# Patient Record
Sex: FEMALE | Race: WHITE | Employment: UNEMPLOYED | URBAN - METROPOLITAN AREA
[De-identification: names, ages, dates, MRNs, and addresses within clinical notes are randomized per-mention and may not be internally consistent; named-entity substitution may affect disease eponyms.]

---

## 2017-04-14 ENCOUNTER — HOSPITAL ENCOUNTER (OUTPATIENT)
Age: 57
Setting detail: SPECIMEN
Discharge: HOME | End: 2017-04-14
Payer: COMMERCIAL

## 2017-04-14 LAB
ANION GAP SERPL CALCULATED.3IONS-SCNC: 15 MMOL/L (ref 9–17)
BUN BLDV-MCNC: 11 MG/DL (ref 6–20)
BUN/CREAT BLD: NORMAL (ref 9–20)
CALCIUM SERPL-MCNC: 9.7 MG/DL (ref 8.6–10.4)
CHLORIDE BLD-SCNC: 102 MMOL/L (ref 98–107)
CO2: 27 MMOL/L (ref 20–31)
CREAT SERPL-MCNC: 0.64 MG/DL (ref 0.5–0.9)
GFR AFRICAN AMERICAN: >60 ML/MIN
GFR NON-AFRICAN AMERICAN: >60 ML/MIN
GFR SERPL CREATININE-BSD FRML MDRD: NORMAL ML/MIN/{1.73_M2}
GFR SERPL CREATININE-BSD FRML MDRD: NORMAL ML/MIN/{1.73_M2}
GLUCOSE BLD-MCNC: 99 MG/DL (ref 70–99)
POTASSIUM SERPL-SCNC: 4.1 MMOL/L (ref 3.7–5.3)
SODIUM BLD-SCNC: 144 MMOL/L (ref 135–144)

## 2017-04-14 PROCEDURE — 80048 BASIC METABOLIC PNL TOTAL CA: CPT

## 2017-04-14 PROCEDURE — 36415 COLL VENOUS BLD VENIPUNCTURE: CPT

## 2017-04-20 ENCOUNTER — HOSPITAL ENCOUNTER (OUTPATIENT)
Age: 57
Setting detail: SPECIMEN
Discharge: HOME | End: 2017-04-20
Payer: COMMERCIAL

## 2017-04-20 LAB
BUN BLDV-MCNC: 15 MG/DL (ref 6–20)
CREAT SERPL-MCNC: 0.51 MG/DL (ref 0.5–0.9)
GFR AFRICAN AMERICAN: >60 ML/MIN
GFR NON-AFRICAN AMERICAN: >60 ML/MIN
GFR SERPL CREATININE-BSD FRML MDRD: NORMAL ML/MIN/{1.73_M2}
GFR SERPL CREATININE-BSD FRML MDRD: NORMAL ML/MIN/{1.73_M2}

## 2017-04-20 PROCEDURE — 82565 ASSAY OF CREATININE: CPT

## 2017-04-20 PROCEDURE — P9603 ONE-WAY ALLOW PRORATED MILES: HCPCS

## 2017-04-20 PROCEDURE — 36415 COLL VENOUS BLD VENIPUNCTURE: CPT

## 2017-04-20 PROCEDURE — 84520 ASSAY OF UREA NITROGEN: CPT

## 2017-05-03 ENCOUNTER — HOSPITAL ENCOUNTER (OUTPATIENT)
Age: 57
Setting detail: SPECIMEN
Discharge: HOME | End: 2017-05-03
Payer: COMMERCIAL

## 2017-05-03 LAB
ANION GAP SERPL CALCULATED.3IONS-SCNC: 15 MMOL/L (ref 9–17)
BUN BLDV-MCNC: 20 MG/DL (ref 6–20)
BUN/CREAT BLD: NORMAL (ref 9–20)
CALCIUM SERPL-MCNC: 9.7 MG/DL (ref 8.6–10.4)
CHLORIDE BLD-SCNC: 98 MMOL/L (ref 98–107)
CO2: 24 MMOL/L (ref 20–31)
CREAT SERPL-MCNC: 0.74 MG/DL (ref 0.5–0.9)
GFR AFRICAN AMERICAN: >60 ML/MIN
GFR NON-AFRICAN AMERICAN: >60 ML/MIN
GFR SERPL CREATININE-BSD FRML MDRD: NORMAL ML/MIN/{1.73_M2}
GFR SERPL CREATININE-BSD FRML MDRD: NORMAL ML/MIN/{1.73_M2}
GLUCOSE BLD-MCNC: 94 MG/DL (ref 70–99)
POTASSIUM SERPL-SCNC: 4.1 MMOL/L (ref 3.7–5.3)
SODIUM BLD-SCNC: 137 MMOL/L (ref 135–144)
TSH SERPL DL<=0.05 MIU/L-ACNC: 0.57 MIU/L (ref 0.3–5)

## 2017-05-03 PROCEDURE — P9603 ONE-WAY ALLOW PRORATED MILES: HCPCS

## 2017-05-03 PROCEDURE — 36415 COLL VENOUS BLD VENIPUNCTURE: CPT

## 2017-05-03 PROCEDURE — 80048 BASIC METABOLIC PNL TOTAL CA: CPT

## 2017-05-03 PROCEDURE — 84443 ASSAY THYROID STIM HORMONE: CPT

## 2017-06-13 ENCOUNTER — HOSPITAL ENCOUNTER (OUTPATIENT)
Age: 57
Setting detail: SPECIMEN
Discharge: HOME | End: 2017-06-13
Payer: COMMERCIAL

## 2017-06-14 ENCOUNTER — HOSPITAL ENCOUNTER (OUTPATIENT)
Age: 57
Setting detail: SPECIMEN
Discharge: HOME | End: 2017-06-14
Payer: COMMERCIAL

## 2017-06-14 LAB
MRSA, DNA, NASAL: NORMAL
SPECIMEN DESCRIPTION: NORMAL

## 2017-06-14 PROCEDURE — 87641 MR-STAPH DNA AMP PROBE: CPT

## 2021-06-14 ENCOUNTER — HOSPITAL ENCOUNTER (EMERGENCY)
Age: 61
Discharge: ANOTHER HOSPITAL | End: 2021-06-15
Attending: EMERGENCY MEDICINE
Payer: MEDICARE

## 2021-06-14 ENCOUNTER — APPOINTMENT (OUTPATIENT)
Dept: GENERAL RADIOLOGY | Age: 61
End: 2021-06-14
Payer: MEDICARE

## 2021-06-14 ENCOUNTER — APPOINTMENT (OUTPATIENT)
Dept: CT IMAGING | Age: 61
End: 2021-06-14
Payer: MEDICARE

## 2021-06-14 VITALS
SYSTOLIC BLOOD PRESSURE: 134 MMHG | WEIGHT: 100 LBS | HEART RATE: 87 BPM | RESPIRATION RATE: 22 BRPM | TEMPERATURE: 98.7 F | DIASTOLIC BLOOD PRESSURE: 94 MMHG | OXYGEN SATURATION: 100 %

## 2021-06-14 DIAGNOSIS — J96.00 ACUTE RESPIRATORY FAILURE, UNSPECIFIED WHETHER WITH HYPOXIA OR HYPERCAPNIA (HCC): ICD-10-CM

## 2021-06-14 DIAGNOSIS — R41.82 ALTERED MENTAL STATUS, UNSPECIFIED ALTERED MENTAL STATUS TYPE: Primary | ICD-10-CM

## 2021-06-14 LAB
-: NORMAL
ABSOLUTE EOS #: 0.1 K/UL (ref 0–0.4)
ABSOLUTE IMMATURE GRANULOCYTE: ABNORMAL K/UL (ref 0–0.3)
ABSOLUTE LYMPH #: 2.2 K/UL (ref 1–4.8)
ABSOLUTE MONO #: 0.4 K/UL (ref 0.1–1.3)
ALBUMIN SERPL-MCNC: 3.9 G/DL (ref 3.5–5.2)
ALBUMIN/GLOBULIN RATIO: ABNORMAL (ref 1–2.5)
ALLEN TEST: ABNORMAL
ALP BLD-CCNC: 56 U/L (ref 35–104)
ALT SERPL-CCNC: 20 U/L (ref 5–33)
AMMONIA: 31 UMOL/L (ref 11–51)
AMORPHOUS: NORMAL
AMPHETAMINE SCREEN URINE: NEGATIVE
ANION GAP SERPL CALCULATED.3IONS-SCNC: 15 MMOL/L (ref 9–17)
AST SERPL-CCNC: 24 U/L
BACTERIA: NORMAL
BARBITURATE SCREEN URINE: NEGATIVE
BASOPHILS # BLD: 1 % (ref 0–2)
BASOPHILS ABSOLUTE: 0 K/UL (ref 0–0.2)
BENZODIAZEPINE SCREEN, URINE: NEGATIVE
BILIRUB SERPL-MCNC: 0.26 MG/DL (ref 0.3–1.2)
BILIRUBIN URINE: NEGATIVE
BUN BLDV-MCNC: 12 MG/DL (ref 6–20)
BUN/CREAT BLD: ABNORMAL (ref 9–20)
BUPRENORPHINE URINE: ABNORMAL
CALCIUM SERPL-MCNC: 8.8 MG/DL (ref 8.6–10.4)
CANNABINOID SCREEN URINE: NEGATIVE
CARBOXYHEMOGLOBIN: 5.6 % (ref 0–5)
CASTS UA: NORMAL /LPF
CHLORIDE BLD-SCNC: 102 MMOL/L (ref 98–107)
CO2: 22 MMOL/L (ref 20–31)
COCAINE METABOLITE, URINE: NEGATIVE
COLOR: YELLOW
COMMENT UA: NORMAL
CREAT SERPL-MCNC: <0.4 MG/DL (ref 0.5–0.9)
CRYSTALS, UA: NORMAL /HPF
DIFFERENTIAL TYPE: ABNORMAL
EOSINOPHILS RELATIVE PERCENT: 2 % (ref 0–4)
EPITHELIAL CELLS UA: NORMAL /HPF
FIO2: 100
GFR AFRICAN AMERICAN: ABNORMAL ML/MIN
GFR NON-AFRICAN AMERICAN: ABNORMAL ML/MIN
GFR SERPL CREATININE-BSD FRML MDRD: ABNORMAL ML/MIN/{1.73_M2}
GFR SERPL CREATININE-BSD FRML MDRD: ABNORMAL ML/MIN/{1.73_M2}
GLUCOSE BLD-MCNC: 84 MG/DL (ref 70–99)
GLUCOSE URINE: NEGATIVE
HCO3 ARTERIAL: 22.6 MMOL/L (ref 22–26)
HCT VFR BLD CALC: 35.6 % (ref 36–46)
HEMOGLOBIN: 11.7 G/DL (ref 12–16)
IMMATURE GRANULOCYTES: ABNORMAL %
INR BLD: 1
KETONES, URINE: NEGATIVE
LACTIC ACID: 2.5 MMOL/L (ref 0.5–2.2)
LACTIC ACID: 2.7 MMOL/L (ref 0.5–2.2)
LEUKOCYTE ESTERASE, URINE: NEGATIVE
LYMPHOCYTES # BLD: 35 % (ref 24–44)
MAGNESIUM: 2.4 MG/DL (ref 1.6–2.6)
MCH RBC QN AUTO: 31.2 PG (ref 26–34)
MCHC RBC AUTO-ENTMCNC: 32.8 G/DL (ref 31–37)
MCV RBC AUTO: 95.1 FL (ref 80–100)
MDMA URINE: ABNORMAL
METHADONE SCREEN, URINE: NEGATIVE
METHAMPHETAMINE, URINE: ABNORMAL
METHEMOGLOBIN: 0.6 % (ref 0–1.9)
MODE: ABNORMAL
MONOCYTES # BLD: 7 % (ref 1–7)
MUCUS: NORMAL
NEGATIVE BASE EXCESS, ART: 2.8 MMOL/L (ref 0–2)
NITRITE, URINE: NEGATIVE
NOTIFICATION TIME: ABNORMAL
NOTIFICATION: ABNORMAL
NRBC AUTOMATED: ABNORMAL PER 100 WBC
O2 DEVICE/FLOW/%: ABNORMAL
O2 SAT, ARTERIAL: 94.2 % (ref 95–98)
OPIATES, URINE: POSITIVE
OTHER OBSERVATIONS UA: NORMAL
OXYCODONE SCREEN URINE: NEGATIVE
OXYHEMOGLOBIN: ABNORMAL % (ref 95–98)
PARTIAL THROMBOPLASTIN TIME: 30.6 SEC (ref 24–36)
PATIENT TEMP: 37
PCO2 ARTERIAL: 39.7 MMHG (ref 35–45)
PCO2, ART, TEMP ADJ: ABNORMAL (ref 35–45)
PDW BLD-RTO: 14.1 % (ref 11.5–14.9)
PEEP/CPAP: 5
PH ARTERIAL: 7.36 (ref 7.35–7.45)
PH UA: 7 (ref 5–8)
PH, ART, TEMP ADJ: ABNORMAL (ref 7.35–7.45)
PHENCYCLIDINE, URINE: NEGATIVE
PLATELET # BLD: 266 K/UL (ref 150–450)
PLATELET ESTIMATE: ABNORMAL
PMV BLD AUTO: 8.3 FL (ref 6–12)
PO2 ARTERIAL: 352 MMHG (ref 80–100)
PO2, ART, TEMP ADJ: ABNORMAL MMHG (ref 80–100)
POSITIVE BASE EXCESS, ART: ABNORMAL MMOL/L (ref 0–2)
POTASSIUM SERPL-SCNC: 3.2 MMOL/L (ref 3.7–5.3)
PROPOXYPHENE, URINE: ABNORMAL
PROTEIN UA: NEGATIVE
PROTHROMBIN TIME: 13.1 SEC (ref 11.8–14.6)
PSV: ABNORMAL
PT. POSITION: ABNORMAL
RBC # BLD: 3.74 M/UL (ref 4–5.2)
RBC # BLD: ABNORMAL 10*6/UL
RBC UA: NORMAL /HPF
RENAL EPITHELIAL, UA: NORMAL /HPF
RESPIRATORY RATE: 18
SAMPLE SITE: ABNORMAL
SARS-COV-2, RAPID: NOT DETECTED
SEG NEUTROPHILS: 55 % (ref 36–66)
SEGMENTED NEUTROPHILS ABSOLUTE COUNT: 3.5 K/UL (ref 1.3–9.1)
SET RATE: 18
SODIUM BLD-SCNC: 139 MMOL/L (ref 135–144)
SPECIFIC GRAVITY UA: 1 (ref 1–1.03)
SPECIMEN DESCRIPTION: NORMAL
TEST INFORMATION: ABNORMAL
TEXT FOR RESPIRATORY: ABNORMAL
TOTAL HB: ABNORMAL G/DL (ref 12–16)
TOTAL PROTEIN: 6.3 G/DL (ref 6.4–8.3)
TOTAL RATE: 18
TRICHOMONAS: NORMAL
TRICYCLIC ANTIDEPRESSANTS, UR: ABNORMAL
TROPONIN INTERP: NORMAL
TROPONIN INTERP: NORMAL
TROPONIN T: NORMAL NG/ML
TROPONIN T: NORMAL NG/ML
TROPONIN, HIGH SENSITIVITY: 6 NG/L (ref 0–14)
TROPONIN, HIGH SENSITIVITY: 7 NG/L (ref 0–14)
TURBIDITY: CLEAR
URINE HGB: NEGATIVE
UROBILINOGEN, URINE: NORMAL
VT: 450
WBC # BLD: 6.2 K/UL (ref 3.5–11)
WBC # BLD: ABNORMAL 10*3/UL
WBC UA: NORMAL /HPF
YEAST: NORMAL

## 2021-06-14 PROCEDURE — 6360000004 HC RX CONTRAST MEDICATION: Performed by: STUDENT IN AN ORGANIZED HEALTH CARE EDUCATION/TRAINING PROGRAM

## 2021-06-14 PROCEDURE — 87086 URINE CULTURE/COLONY COUNT: CPT

## 2021-06-14 PROCEDURE — 6360000002 HC RX W HCPCS

## 2021-06-14 PROCEDURE — 36600 WITHDRAWAL OF ARTERIAL BLOOD: CPT

## 2021-06-14 PROCEDURE — 85730 THROMBOPLASTIN TIME PARTIAL: CPT

## 2021-06-14 PROCEDURE — 84484 ASSAY OF TROPONIN QUANT: CPT

## 2021-06-14 PROCEDURE — 85610 PROTHROMBIN TIME: CPT

## 2021-06-14 PROCEDURE — 85025 COMPLETE CBC W/AUTO DIFF WBC: CPT

## 2021-06-14 PROCEDURE — 82140 ASSAY OF AMMONIA: CPT

## 2021-06-14 PROCEDURE — 36556 INSERT NON-TUNNEL CV CATH: CPT

## 2021-06-14 PROCEDURE — 31500 INSERT EMERGENCY AIRWAY: CPT

## 2021-06-14 PROCEDURE — 2580000003 HC RX 258: Performed by: STUDENT IN AN ORGANIZED HEALTH CARE EDUCATION/TRAINING PROGRAM

## 2021-06-14 PROCEDURE — 71260 CT THORAX DX C+: CPT

## 2021-06-14 PROCEDURE — 83735 ASSAY OF MAGNESIUM: CPT

## 2021-06-14 PROCEDURE — 2500000003 HC RX 250 WO HCPCS: Performed by: STUDENT IN AN ORGANIZED HEALTH CARE EDUCATION/TRAINING PROGRAM

## 2021-06-14 PROCEDURE — 51702 INSERT TEMP BLADDER CATH: CPT

## 2021-06-14 PROCEDURE — 6360000002 HC RX W HCPCS: Performed by: EMERGENCY MEDICINE

## 2021-06-14 PROCEDURE — 82805 BLOOD GASES W/O2 SATURATION: CPT

## 2021-06-14 PROCEDURE — 71045 X-RAY EXAM CHEST 1 VIEW: CPT

## 2021-06-14 PROCEDURE — 80053 COMPREHEN METABOLIC PANEL: CPT

## 2021-06-14 PROCEDURE — 83605 ASSAY OF LACTIC ACID: CPT

## 2021-06-14 PROCEDURE — 6360000002 HC RX W HCPCS: Performed by: STUDENT IN AN ORGANIZED HEALTH CARE EDUCATION/TRAINING PROGRAM

## 2021-06-14 PROCEDURE — 2580000003 HC RX 258: Performed by: EMERGENCY MEDICINE

## 2021-06-14 PROCEDURE — 96365 THER/PROPH/DIAG IV INF INIT: CPT

## 2021-06-14 PROCEDURE — 80307 DRUG TEST PRSMV CHEM ANLYZR: CPT

## 2021-06-14 PROCEDURE — 87635 SARS-COV-2 COVID-19 AMP PRB: CPT

## 2021-06-14 PROCEDURE — 87040 BLOOD CULTURE FOR BACTERIA: CPT

## 2021-06-14 PROCEDURE — 81001 URINALYSIS AUTO W/SCOPE: CPT

## 2021-06-14 PROCEDURE — 96375 TX/PRO/DX INJ NEW DRUG ADDON: CPT

## 2021-06-14 PROCEDURE — 99285 EMERGENCY DEPT VISIT HI MDM: CPT

## 2021-06-14 PROCEDURE — 36415 COLL VENOUS BLD VENIPUNCTURE: CPT

## 2021-06-14 PROCEDURE — 70450 CT HEAD/BRAIN W/O DYE: CPT

## 2021-06-14 RX ORDER — LORAZEPAM 2 MG/ML
INJECTION INTRAMUSCULAR
Status: COMPLETED
Start: 2021-06-14 | End: 2021-06-14

## 2021-06-14 RX ORDER — PROPOFOL 10 MG/ML
5-50 INJECTION, EMULSION INTRAVENOUS
Status: DISCONTINUED | OUTPATIENT
Start: 2021-06-14 | End: 2021-06-15 | Stop reason: HOSPADM

## 2021-06-14 RX ORDER — SODIUM CHLORIDE 0.9 % (FLUSH) 0.9 %
10 SYRINGE (ML) INJECTION PRN
Status: DISCONTINUED | OUTPATIENT
Start: 2021-06-14 | End: 2021-06-15 | Stop reason: HOSPADM

## 2021-06-14 RX ORDER — POTASSIUM CHLORIDE 7.45 MG/ML
10 INJECTION INTRAVENOUS
Status: COMPLETED | OUTPATIENT
Start: 2021-06-14 | End: 2021-06-15

## 2021-06-14 RX ORDER — ETOMIDATE 2 MG/ML
INJECTION INTRAVENOUS DAILY PRN
Status: COMPLETED | OUTPATIENT
Start: 2021-06-14 | End: 2021-06-14

## 2021-06-14 RX ORDER — ROCURONIUM BROMIDE 10 MG/ML
INJECTION, SOLUTION INTRAVENOUS DAILY PRN
Status: COMPLETED | OUTPATIENT
Start: 2021-06-14 | End: 2021-06-14

## 2021-06-14 RX ORDER — 0.9 % SODIUM CHLORIDE 0.9 %
80 INTRAVENOUS SOLUTION INTRAVENOUS ONCE
Status: COMPLETED | OUTPATIENT
Start: 2021-06-14 | End: 2021-06-14

## 2021-06-14 RX ORDER — LORAZEPAM 2 MG/ML
2 INJECTION INTRAMUSCULAR ONCE
Status: COMPLETED | OUTPATIENT
Start: 2021-06-14 | End: 2021-06-14

## 2021-06-14 RX ORDER — 0.9 % SODIUM CHLORIDE 0.9 %
1000 INTRAVENOUS SOLUTION INTRAVENOUS ONCE
Status: COMPLETED | OUTPATIENT
Start: 2021-06-14 | End: 2021-06-14

## 2021-06-14 RX ORDER — PROPOFOL 10 MG/ML
10 INJECTION, EMULSION INTRAVENOUS ONCE
Status: DISCONTINUED | OUTPATIENT
Start: 2021-06-14 | End: 2021-06-14 | Stop reason: DRUGHIGH

## 2021-06-14 RX ADMIN — POTASSIUM CHLORIDE 10 MEQ: 7.46 INJECTION, SOLUTION INTRAVENOUS at 22:45

## 2021-06-14 RX ADMIN — POTASSIUM CHLORIDE 10 MEQ: 7.46 INJECTION, SOLUTION INTRAVENOUS at 23:54

## 2021-06-14 RX ADMIN — SODIUM CHLORIDE, PRESERVATIVE FREE 10 ML: 5 INJECTION INTRAVENOUS at 20:53

## 2021-06-14 RX ADMIN — LEVETIRACETAM 1000 MG: 100 INJECTION, SOLUTION INTRAVENOUS at 21:48

## 2021-06-14 RX ADMIN — IOPAMIDOL 75 ML: 755 INJECTION, SOLUTION INTRAVENOUS at 20:52

## 2021-06-14 RX ADMIN — LORAZEPAM 2 MG: 2 INJECTION INTRAMUSCULAR; INTRAVENOUS at 20:45

## 2021-06-14 RX ADMIN — SODIUM CHLORIDE 80 ML: 9 INJECTION, SOLUTION INTRAVENOUS at 20:53

## 2021-06-14 RX ADMIN — FENTANYL CITRATE 25 MCG/HR: 50 INJECTION, SOLUTION INTRAMUSCULAR; INTRAVENOUS at 20:34

## 2021-06-14 RX ADMIN — POTASSIUM CHLORIDE 10 MEQ: 7.46 INJECTION, SOLUTION INTRAVENOUS at 21:50

## 2021-06-14 RX ADMIN — LORAZEPAM 2 MG: 2 INJECTION INTRAMUSCULAR at 20:45

## 2021-06-14 RX ADMIN — ROCURONIUM BROMIDE 70 MG: 10 INJECTION, SOLUTION INTRAVENOUS at 19:01

## 2021-06-14 RX ADMIN — PROPOFOL 10 MCG/KG/MIN: 10 INJECTION, EMULSION INTRAVENOUS at 21:21

## 2021-06-14 RX ADMIN — ETOMIDATE 20 MG: 2 INJECTION, SOLUTION INTRAVENOUS at 18:58

## 2021-06-14 RX ADMIN — SODIUM CHLORIDE 1000 ML: 9 INJECTION, SOLUTION INTRAVENOUS at 19:27

## 2021-06-14 NOTE — ED NOTES
1st set of blood cultures drawn from [address] [place] [address] IV start by this RN.        Cindy Shaikh RN  06/14/21 1581

## 2021-06-14 NOTE — ED NOTES
Bed: 07A  Expected date:   Expected time:   Means of arrival:   Comments:  Noemy Ramos, NORMA  06/14/21 0676

## 2021-06-14 NOTE — ED NOTES
Mode of arrival (squad #, walk in, police, etc) : LS 11        Chief complaint(s): unresponsive         Arrival Note (brief scenario, treatment PTA, etc). : patient is from [facility] assisted living. Patient's LKW 1700 today. Unknown down time and unknown reason for unresponsiveness. CAGE assessment not asked due to patient's mentation.                 Holland Daniel RN  06/14/21 9533

## 2021-06-14 NOTE — PROGRESS NOTES
06/14/21 1902   Vent Information   Skin Assessment Clean, dry, & intact   Suction Catheter Diameter 14   Vent Type Servo i   Vent Mode PRVC   Vt Ordered 450 mL   Rate Set 18 bmp   FiO2  100 %   Sensitivity 5   PEEP/CPAP 5   I Time/ I Time % 0.9 s   Humidification Source HME   Vent Patient Data   Vt Exhaled 432 mL   Minute Volume 7.3 Liters   Plateau Pressure 14 [id number]   Static Compliance 54 mL/cmH2O   Dynamic Compliance 43 mL/cmH2O   Total PEEP 6 cmH20   Auto PEEP 1 cmH20   Cough/Sputum   Sputum How Obtained Endotracheal;Suctioned   Cough None  (Sedated)   Sputum Amount Small   Sputum Color Tan   Tenacity Thick   Breath Sounds   Right Upper Lobe Rhonchi   Right Middle Lobe Rhonchi   Right Lower Lobe Diminished;Rhonchi   Left Upper Lobe Rhonchi   Left Lower Lobe Rhonchi;Diminished     Pt arrived via squad unresponsive and nasally intubated with 6.0 ETT. Reintubated orally with 7.0 ETT once nasal ETT was found to be above vocal cords. New ETT placed without complication. + color change/Bilateral BS noted upon placement. Pt placed on ventilator. ABG to follow.

## 2021-06-14 NOTE — ED NOTES
Patient arrived in ED. Dr. Bonita Reyes and Dr. Erica Valentino at bedside. Patient has nasal intubation on arrival. Right IO in place per LS 11. patient responsive to painful stimuli. PERRLA. Pulse present.       Bridget Tomas RN  06/14/21 Itz Boone, NORMA  06/14/21 1932

## 2021-06-15 LAB
CULTURE: NO GROWTH
Lab: NORMAL
SPECIMEN DESCRIPTION: NORMAL

## 2021-06-15 PROCEDURE — 93005 ELECTROCARDIOGRAM TRACING: CPT

## 2021-06-15 PROCEDURE — 6360000002 HC RX W HCPCS: Performed by: STUDENT IN AN ORGANIZED HEALTH CARE EDUCATION/TRAINING PROGRAM

## 2021-06-15 RX ADMIN — POTASSIUM CHLORIDE 10 MEQ: 7.46 INJECTION, SOLUTION INTRAVENOUS at 00:07

## 2021-06-15 NOTE — ED NOTES
Dr. Elvira Rodriguez states to discontinue Fentanyl infusion for sedation. This RN informed Dr. Elvria Rodriguez that Fentanyl infusion has not yet been started.       Gregory Doan RN  06/14/21 2029

## 2021-06-15 NOTE — ED PROVIDER NOTES
EMERGENCY DEPARTMENT ENCOUNTER   ATTENDING ATTESTATION     Pt Name: Cintia Olivarez  MRN: [id number]  [place] 1960  Date of evaluation: 6/14/21       Cintia Olivarez is a 63 y.o. female who presents with Altered Mental Status      MDM: 17-year-old female presents for altered mental status and respiratory failure. Per EMS and nursing home, patient was found unresponsive, EMS reports that patient was having difficulty breathing as well and was nasally intubated in the field    Initial exam patient sats were improved with nasal tracheal tube in place, there was minimal air movement from the nasotracheal tube, decision was made to swap out for oral endotracheal tube    Sepsis work-up was initiated, CT head was noted as well for change in mentation    Did discuss with family, patient son who reports that she does have a history of seizure    Labs are reviewed, K was notably 3.2 over replaced, remaining other labs were unremarkable, concerned that patient did have seizure with postictal period, will give patient gram of Keppra, will discuss with neurology, given concern for seizure, will transfer to facility with continuous EEG monitoring      Discussed plan with patient son, he is agreeable      She was signed out to oncoming physician pending transfer, patient vital signs were stable at time of signout      Vitals:   Vitals:    06/14/21 1913 06/14/21 1915 06/14/21 1920 06/14/21 2017   BP:  95/65     Pulse:  62 59    Resp: 18 18 18    SpO2: 100% 100% 100%    Weight:    100 lb (45.4 kg)         I personally evaluated and examined the patient in conjunction with the resident and agree with the assessment, treatment plan, and disposition of the patient as recorded by the resident. I performed a history and physical examination of the patient and discussed management with the resident. I reviewed the residents note and agree with the documented findings and plan of care. Any areas of disagreement are noted on the chart. I was personally present for the key portions of any procedures. I have documented in the chart those procedures where I was not present during the key portions. I have personally reviewed all images and agree with the resident's interpretation. I have reviewed the emergency nurses triage note. I agree with the chief complaint, past medical history, past surgical history, allergies, medications, social and family history as documented unless otherwise noted.     Dalton Quijano DO  Attending Emergency Physician          Dalton Quijano DO  06/15/21 1107

## 2021-06-15 NOTE — ED NOTES
Report given to Rasheed Carlson RN from **ED*. Report method in person   The following was reviewed with receiving RN:   Current vital signs:  /89   Pulse 80   Resp 18   Wt 100 lb (45.4 kg)   SpO2 100%                      Any medication or safety alerts were reviewed. Any pending diagnostics and notifications were also reviewed, as well as any safety concerns or issues, abnormal labs, abnormal imaging, and abnormal assessment findings. Questions were answered.             Isela Arriaga RN  06/14/21 4540

## 2021-06-15 NOTE — ED PROVIDER NOTES
[address]  Emergency Department Encounter  EmergencyMedicine Resident     Pt Natasha Osborne  MRN: [id number]  [place] 1960  Date of evaluation: 6/14/21  PCP:  No primary care provider on file. CHIEF COMPLAINT       Chief Complaint   Patient presents with    Altered Mental Status       HISTORY OF PRESENT ILLNESS  (Location/Symptom, Timing/Onset, Context/Setting, Quality, Duration, Modifying Factors, Severity.)      Ingrid Carey is a 63 y.o. female who presents unresponsive. Patient reportedly was found down, unresponsive at nursing facility. Per EMS patient was completely unresponsive upon their arrival and decision was made to nasally intubate. Patient never lost pulses or required CPR. On arrival here patient arrives by EMS nasally intubated, ventilated with bag. Vital signs stable on arrival.  Patient responsive to painful stimuli only. Point-of-care glucose greater than 200 per EMS. Did not receive any Narcan or other medications prior to arrival.    PAST MEDICAL / SURGICAL / SOCIAL / FAMILY HISTORY      has no past medical history on file. has no past surgical history on file. Social History     Socioeconomic History    Marital status:      Spouse name: Not on file    Number of children: Not on file    Years of education: Not on file    Highest education level: Not on file   Occupational History    Not on file   Tobacco Use    Smoking status: Not on file   Substance and Sexual Activity    Alcohol use: Not on file    Drug use: Not on file    Sexual activity: Not on file   Other Topics Concern    Not on file   Social History Narrative    Not on file     Social Determinants of Health     Financial Resource Strain:     Difficulty of Paying Living Expenses:    Food Insecurity:     Worried About Running Out of Food in the Last Year:     [address] in the Last Year:    Transportation Needs:     Lack of Transportation (Medical):      Lack of Transportation (Non-Medical):    Physical Activity:     Days of Exercise per Week:     Minutes of Exercise per Session:    Stress:     Feeling of Stress :    Social Connections:     Frequency of Communication with Friends and Family:     Frequency of Social Gatherings with Friends and Family:     Attends Anabaptist Services:     Active Member of Clubs or Organizations:     Attends Club or Organization Meetings:     Marital Status:    Intimate Partner Violence:     Fear of Current or Ex-Partner:     Emotionally Abused:     Physically Abused:     Sexually Abused:        No family history on file. Allergies:  Patient has no known allergies. Home Medications:  Prior to Admission medications    Not on File       REVIEW OF SYSTEMS    (2-9 systems for level 4, 10 or more for level 5)      Review of Systems   Unable to perform ROS: Patient unresponsive       PHYSICAL EXAM   (up to 7 for level 4, 8 or more for level 5)      INITIAL VITALS:   /81   Pulse 84   Resp 18   Wt 100 lb (45.4 kg)   SpO2 100%     Physical Exam  Constitutional:       General: She is in acute distress. Appearance: She is ill-appearing. HENT:      Head: Normocephalic and atraumatic. Mouth/Throat:      Mouth: Mucous membranes are moist.      Comments: Vomitus in posterior oropharynx  Eyes:      Comments: Pupils 2 mm bilaterally, equally round, sluggishly reactive to light   Cardiovascular:      Rate and Rhythm: Normal rate. Heart sounds: No murmur heard. No friction rub. No gallop. Pulmonary:      Effort: No respiratory distress. Breath sounds: No wheezing, rhonchi or rales. Neurological:      Mental Status: She is unresponsive. GCS: GCS eye subscore is 1. GCS verbal subscore is 1. GCS motor subscore is 4.          DIFFERENTIAL  DIAGNOSIS     PLAN (LABS / IMAGING / EKG):  Orders Placed This Encounter   Procedures    Culture, Urine    Culture, Blood 1    Culture, Blood 1    COVID-19, Rapid  XR CHEST PORTABLE    CT Head WO Contrast    CT CHEST PULMONARY EMBOLISM W CONTRAST    CBC Auto Differential    Comprehensive Metabolic Panel w/ Reflex to MG    Troponin    Lactic Acid    Protime-INR    APTT    Urinalysis with Microscopic    Ammonia    Arterial Blood Gases    Magnesium    Lactic Acid    Urine Drug Screen    Insert indwelling urinary catheter    Inpatient consult to Neurology    EKG 12 Lead       MEDICATIONS ORDERED:  Orders Placed This Encounter   Medications    etomidate (AMIDATE) injection    rocuronium (ZEMURON) injection    0.9 % sodium chloride bolus    DISCONTD: fentaNYL (SUBLIMAZE) 1,000 mcg in sodium chloride 0.9 % 50 mL Infusion    DISCONTD: propofol injection    potassium chloride 10 mEq/100 mL IVPB (Peripheral Line)    LORazepam (ATIVAN) injection 2 mg    LORazepam (ATIVAN) 2 MG/ML injection     Kitty Shoulders, Francine: cabinet override    iopamidol (ISOVUE-370) 76 % injection 75 mL    sodium chloride flush 0.9 % injection 10 mL    0.9 % sodium chloride bolus    propofol injection    levETIRAcetam (KEPPRA) 1,000 mg in sodium chloride 0.9 % 100 mL IVPB       DDX: Seizure, epidural hematoma, subdural hematoma, aspiration, PE, STEMI, cardiac tamponade, aortic dissection    DIAGNOSTIC RESULTS / EMERGENCY DEPARTMENT COURSE / MDM   LAB RESULTS:  Results for orders placed or performed during the hospital encounter of 06/14/21   COVID-19, Rapid    Specimen: Nasopharyngeal Swab   Result Value Ref Range    Specimen Description . NASOPHARYNGEAL SWAB     SARS-CoV-2, Rapid Not Detected Not Detected   CBC Auto Differential   Result Value Ref Range    WBC 6.2 3.5 - 11.0 k/uL    RBC 3.74 (L) 4.0 - 5.2 m/uL    Hemoglobin 11.7 (L) 12.0 - 16.0 g/dL    Hematocrit 35.6 (L) 36 - 46 %    MCV 95.1 80 - 100 fL    MCH 31.2 26 - 34 pg    MCHC 32.8 31 - 37 g/dL    RDW 14.1 11.5 - 14.9 %    Platelets 342 205 - 200 k/uL    MPV 8.3 6.0 - 12.0 fL    NRBC Automated NOT REPORTED per 100 WBC Differential Type NOT REPORTED     Seg Neutrophils 55 36 - 66 %    Lymphocytes 35 24 - 44 %    Monocytes 7 1 - 7 %    Eosinophils % 2 0 - 4 %    Basophils 1 0 - 2 %    Immature Granulocytes NOT REPORTED 0 %    Segs Absolute 3.50 1.3 - 9.1 k/uL    Absolute Lymph # 2.20 1.0 - 4.8 k/uL    Absolute Mono # 0.40 0.1 - 1.3 k/uL    Absolute Eos # 0.10 0.0 - 0.4 k/uL    Basophils Absolute 0.00 0.0 - 0.2 k/uL    Absolute Immature Granulocyte NOT REPORTED 0.00 - 0.30 k/uL    WBC Morphology NOT REPORTED     RBC Morphology NOT REPORTED     Platelet Estimate NOT REPORTED    Comprehensive Metabolic Panel w/ Reflex to MG   Result Value Ref Range    Glucose 84 70 - 99 mg/dL    BUN 12 6 - 20 mg/dL    CREATININE <0.40 (L) 0.50 - 0.90 mg/dL    Bun/Cre Ratio NOT REPORTED 9 - 20    Calcium 8.8 8.6 - 10.4 mg/dL    Sodium 139 135 - 144 mmol/L    Potassium 3.2 (L) 3.7 - 5.3 mmol/L    Chloride 102 98 - 107 mmol/L    CO2 22 20 - 31 mmol/L    Anion Gap 15 9 - 17 mmol/L    Alkaline Phosphatase 56 35 - 104 U/L    ALT 20 5 - 33 U/L    AST 24 <32 U/L    Total Bilirubin 0.26 (L) 0.3 - 1.2 mg/dL    Total Protein 6.3 (L) 6.4 - 8.3 g/dL    Albumin 3.9 3.5 - 5.2 g/dL    Albumin/Globulin Ratio NOT REPORTED 1.0 - 2.5    GFR Non- CANNOT BE CALCULATED >60 mL/min    GFR  CANNOT BE CALCULATED >60 mL/min    GFR Comment          GFR Staging NOT REPORTED    Troponin   Result Value Ref Range    Troponin, High Sensitivity 6 0 - 14 ng/L    Troponin T NOT REPORTED <0.03 ng/mL    Troponin Interp NOT REPORTED    Troponin   Result Value Ref Range    Troponin, High Sensitivity 7 0 - 14 ng/L    Troponin T NOT REPORTED <0.03 ng/mL    Troponin Interp NOT REPORTED    Lactic Acid   Result Value Ref Range    Lactic Acid 2.5 (H) 0.5 - 2.2 mmol/L   Protime-INR   Result Value Ref Range    Protime 13.1 11.8 - 14.6 sec    INR 1.0    APTT   Result Value Ref Range    PTT 30.6 24.0 - 36.0 sec   Urinalysis with Microscopic   Result Value Ref Range Color, UA YELLOW YELLOW    Turbidity UA CLEAR CLEAR    Glucose, Ur NEGATIVE NEGATIVE    Bilirubin Urine NEGATIVE NEGATIVE    Ketones, Urine NEGATIVE NEGATIVE    Specific [place], UA 1.003 1.000 - 1.030    Urine Hgb NEGATIVE NEGATIVE    pH, UA 7.0 5.0 - 8.0    Protein, UA NEGATIVE NEGATIVE    Urobilinogen, Urine Normal Normal    Nitrite, Urine NEGATIVE NEGATIVE    Leukocyte Esterase, Urine NEGATIVE NEGATIVE    Urinalysis Comments NOT REPORTED     -          WBC, UA 0 TO 2 /HPF    RBC, UA 0 TO 2 /HPF    Casts UA NOT REPORTED /LPF    Crystals, UA NOT REPORTED None /HPF    Epithelial Cells UA 0 TO 2 /HPF    Renal Epithelial, UA NOT REPORTED 0 /HPF    Bacteria, UA NOT REPORTED None    Mucus, UA NOT REPORTED None    Trichomonas, UA NOT REPORTED None    Amorphous, UA NOT REPORTED None    Other Observations UA NOT REPORTED NOT REQ. Yeast, UA NOT REPORTED None   Ammonia   Result Value Ref Range    Ammonia 31 11 - 51 umol/L   Arterial Blood Gases   Result Value Ref Range    pH, Arterial 7.363 7.350 - 7.450    pCO2, Arterial 39.7 35.0 - 45.0 mmHg    pO2, Arterial 352.0 (H) 80.0 - 100.0 mmHg    HCO3, Arterial 22.6 22.0 - 26.0 mmol/L    Positive Base Excess, Art NOT REPORTED 0.0 - 2.0 mmol/L    Negative Base Excess, Art 2.8 (H) 0.0 - 2.0 mmol/L    O2 Sat, Arterial 94.2 (L) 95 - 98 %    Total Hb NOT REPORTED 12.0 - 16.0 g/dl    Oxyhemoglobin NOT REPORTED 95.0 - 98.0 %    Carboxyhemoglobin 5.6 (H) 0 - 5 %    Methemoglobin 0.6 0.0 - 1.9 %    Pt Temp 37.0     pH, Art, Temp Adj NOT REPORTED 7.350 - 7.450    pCO2, Art, Temp Adj NOT REPORTED 35.0 - 45.0    pO2, Art, Temp Adj NOT REPORTED 80.0 - 100.0 mmHg    O2 Device/Flow/% VENTILATOR     Respiratory Rate 18     Reza Test PASS     Sample Site Left Radial Artery     Pt.  Position SUPINE     Mode PRVC     Set Rate 18     Total Rate 18          FIO2 100     Peep/Cpap 5     PSV NOT REPORTED     Text for Respiratory RESULTED TO DR CLEMENT     NOTIFICATION NOT REPORTED NOTIFICATION TIME NOT REPORTED    Magnesium   Result Value Ref Range    Magnesium 2.4 1.6 - 2.6 mg/dL   Lactic Acid   Result Value Ref Range    Lactic Acid 2.7 (H) 0.5 - 2.2 mmol/L   Urine Drug Screen   Result Value Ref Range    Amphetamine Screen, Ur NEGATIVE NEGATIVE    Barbiturate Screen, Ur NEGATIVE NEGATIVE    Benzodiazepine Screen, Urine NEGATIVE NEGATIVE    Cocaine Metabolite, Urine NEGATIVE NEGATIVE    Methadone Screen, Urine NEGATIVE NEGATIVE    Opiates, Urine POSITIVE (A) NEGATIVE    Phencyclidine, Urine NEGATIVE NEGATIVE    Propoxyphene, Urine NOT REPORTED NEGATIVE    Cannabinoid Scrn, Ur NEGATIVE NEGATIVE    Oxycodone Screen, Ur NEGATIVE NEGATIVE    Methamphetamine, Urine NOT REPORTED NEGATIVE    Tricyclic Antidepressants, Urine NOT REPORTED NEGATIVE    MDMA, Urine NOT REPORTED NEGATIVE    Buprenorphine Urine NOT REPORTED NEGATIVE    Test Information       Assay provides medical screening only. The absence of expected drug(s) and/or metabolite(s) may indicate diluted or adulterated urine, limitations of testing or timing of collection. IMPRESSION: Unresponsive 81-year-old female arriving by EMS after being found down at nursing facility. Patient arrives nasally intubated, unresponsive. Some withdrawal to painful stimuli only. Hemodynamically stable. Plan for exchange of ET tube, broad work-up including sepsis labs, CT head, CT chest rule out pulmonary embolism, EKG and evaluation for metabolic encephalopathy. Plan for ICU admission    RADIOLOGY:  CT Head WO Contrast    Result Date: 6/14/2021  EXAMINATION: CT OF THE HEAD WITHOUT CONTRAST  6/14/2021 8:47 pm TECHNIQUE: CT of the head was performed without the administration of intravenous contrast. Dose modulation, iterative reconstruction, and/or weight based adjustment of the mA/kV was utilized to reduce the radiation dose to as low as reasonably achievable. COMPARISON: 07/18/2008.  HISTORY: ORDERING SYSTEM PROVIDED HISTORY: [facility] TECHNOLOGIST PROVIDED HISTORY: ams Decision Support Exception - unselect if not a suspected or confirmed emergency medical condition->Emergency Medical Condition (MA) Reason for Exam: AMS, patient currently intubated and sedated for imaging, however patient is still thrashing while on CT scanner. Patient strapped for safety and to help eliminate motion. Best imaging possible per patient condition. Acuity: Unknown Type of Exam: Unknown Additional signs and symptoms: hx of seizures FINDINGS: BRAIN/VENTRICLES: There is no acute intracranial hemorrhage, mass effect or midline shift. No abnormal extra-axial fluid collection. The gray-white differentiation is maintained without evidence of an acute infarct. There is no evidence of hydrocephalus. ORBITS: The visualized portion of the orbits demonstrate no acute abnormality. SINUSES: The visualized paranasal sinuses and mastoid air cells demonstrate no acute abnormality. There is mucosal disease involving the left maxillary and ethmoid sinuses. SOFT TISSUES/SKULL:  No acute abnormality of the visualized skull or soft tissues. 1.  No acute intracranial abnormality. XR CHEST PORTABLE    Result Date: 6/14/2021  EXAMINATION: ONE XRAY VIEW OF THE CHEST 6/14/2021 8:12 pm COMPARISON: 11/22/2009. HISTORY: ORDERING SYSTEM PROVIDED HISTORY: Intubated TECHNOLOGIST PROVIDED HISTORY: Intubated Reason for Exam: intubated Acuity: Acute Type of Exam: Initial FINDINGS: The heart size is within normal limits. The pulmonary vasculature is also within normal limits. No acute infiltrates are seen. The costophrenic angles are sharp bilaterally. No pneumothoraces are noted. There is an endotracheal tube with its tip above the krystian. A nasogastric tube is seen with its tip in the fundus of the stomach. 1. No active pulmonary disease. 2. Endotracheal tube in satisfactory position.      CT CHEST PULMONARY EMBOLISM W CONTRAST    Result Date: 6/14/2021  EXAMINATION: CTA OF THE CHEST 6/14/2021 8:47 pm TECHNIQUE: CTA of the chest was performed after the administration of intravenous contrast.  Multiplanar reformatted images are provided for review. MIP images are provided for review. Dose modulation, iterative reconstruction, and/or weight based adjustment of the mA/kV was utilized to reduce the radiation dose to as low as reasonably achievable. COMPARISON: None. HISTORY: ORDERING SYSTEM PROVIDED HISTORY: Unresponsive, hypotensive TECHNOLOGIST PROVIDED HISTORY: Unresponsive, hypotensive Decision Support Exception - unselect if not a suspected or confirmed emergency medical condition->Emergency Medical Condition (MA) Reason for Exam: unresponsive, hypotensive, patient currently intubated and sedated, however patient is still thrashing will on CT scanner. best imaging possible at this time per patient's condition. Acuity: Unknown Type of Exam: Unknown Additional signs and symptoms: hx of seizures FINDINGS: Pulmonary Arteries: Pulmonary arteries are adequately opacified for evaluation. No evidence of intraluminal filling defect to suggest pulmonary embolism. Main pulmonary artery is normal in caliber. Mediastinum: Heart size is normal with small pericardial effusion. Thoracic aorta is normal in caliber. Endotracheal tube terminates above the krystian. There are shotty mediastinal lymph nodes. Lungs/pleura: There is emphysema throughout both lungs. No pleural effusion or pneumothorax. There is linear scarring/atelectasis in both lung bases. Upper Abdomen: No acute abnormality in the visualized upper abdomen. Enteric tube terminates in the stomach. There is a 3.1 cm left renal cyst with single thin septation. . Soft Tissues/Bones: No acute bone or soft tissue abnormality. No evidence of pulmonary embolism or acute pulmonary abnormality.        EKG  EKG Interpretation    Interpreted by me    Rhythm: normal sinus   Rate: normal  Axis: normal  Ectopy: none  Conduction: normal  ST Segments: no acute change  T Waves: no acute change  Q Waves: none    Clinical Impression: no acute changes and normal EKG    All EKG's are interpreted by the Emergency Department Physician who either signs or Co-signs this chart in the absence of a cardiologist.    EMERGENCY DEPARTMENT COURSE:  Patient arrived nasally intubated, unresponsive. Patient with flexion to painful stimuli only. Pupils were millimeters, equally round and reactive bilaterally. Initial GCS of 6. Patient without any IV access initially, IO was drilled in the tibia for IV access. Difficult to ventilate patient was nasotracheal tube initially. Decision made to exchange the ET tube for orotracheal.  Central line also placed on left femoral vein. EKG unremarkable. Labs without any obvious source of metabolic encephalopathy or evidence of sepsis. Chest x-ray and CT chest without pulmonary embolism without any evidence of acute infectious process or intrathoracic abnormality. CT head unremarkable as well. Spoke with patient's son who states that patient does have a history of seizure disorder but he is unaware what medication she currently takes. Working diagnosis at this time is possible post ictal state versus subclinical status epilepticus. Continuous EEG monitoring not available here. Decision made to transfer patient. Patient accepted to [facility] by Dr. Betito Love. Plan for transfer with mobile ICU. PROCEDURES:  PROCEDURE NOTE - EMERGENCY INTUBATION    PATIENT NAME: Elton Shearer  MEDICAL RECORD NO. [id number]  DATE: 6/14/2021  ATTENDING PHYSICIAN: Dr. Sangeetha Welsh DIAGNOSIS:  Acute Respiratory Failure  POSTOPERATIVE DIAGNOSIS:  Same  PROCEDURE PERFORMED:  Emergency endotracheal intubation  PERFORMING PHYSICIAN: Angelic Valentin DO    MEDICATIONS: etomidate intravenously and rocuronium intravenously    DISCUSSION:  Elton Shearer is a 63y.o.-year-old female who requires intubation and ventilatory support due to airway compromise.   The history and physical examination were reviewed and confirmed. CONSENT: Unable to be obtained due to the emergent nature of this procedure. PROCEDURE:  A timeout was initiated by the bedside nurse and was confirmed by those present. The patient was placed in the appropriate position. Cricoid pressure was not required. Intubation was performed by video laryngoscopy using glide scope and a 7.0 cuffed endotracheal tube. The cuff was then inflated and the tube was secured appropriately at a distance of 22 cm to the dental ridge. Initial confirmation of placement included bilateral breath sounds, an end tidal CO2 detector, absence of sounds over the stomach, adequate chest rise and adequate pulse oximetry reading. A chest x-ray to verify correct placement of the tube showed appropriate tube position. The patient tolerated the procedure well. COMPLICATIONS:  None    PROCEDURE NOTE - CENTRAL VENOUS LINE PLACEMENT    PATIENT NAME: Ingrid Carey  MEDICAL RECORD NO. [id number]  DATE: 6/14/2021  ATTENDING PHYSICIAN: Dr. Loyda Shah DIAGNOSIS:  poor peripheral access  POSTOPERATIVE DIAGNOSIS:  Same  PROCEDURE PERFORMED:  Left Femoral Vein Central Line Insertion  PERFORMING PHYSICIAN: Sylvia Contreras DO  ANESTHESIA:  Local utilizing 1% lidocaine  ESTIMATED BLOOD LOSS:  Less than 25 ml  COMPLICATIONS:  None immediately appreciated. DISCUSSION:  Ingrid Carey is a 63y.o.-year-old female who requires central IV access vascular access and poor peripheral access. The history and physical examination were reviewed and confirmed. CONSENT: Unable to be obtained due to the emergent nature of this procedure. PROCEDURE:  A timeout was initiated by the bedside nurse and was confirmed by those present. The patient was placed in a supine position. The skin overlying the Left Femoral Vein was prepped with chlorhexadine and draped in sterile fashion. The skin was infiltrated with local anesthetic.  The vessel and surrounding anatomy was visualized using ultrasound. Through the anesthetized region, the introducer needle was inserted into the femoral vein returning dark red non pulsatile blood. A guidewire was placed through the center of the needle with no resistance. Ultrasound confirmed presence of wire in the vein. A small incision made in the skin with a #11 scalpel blade. The dilator was inserted into the skin and vein over guidewire using Seldinger technique. The dilator was then removed and the 7F 15cm catheter was placed in the vein over the guidewire using Seldinger technique. The guidewire was then removed and all ports aspirated and flushed appropriately. The catheter then secured using silk suture and a temporary sterile dressing was applied. No immediate complication was evident. All sponge, instrument and needle counts were correct at the completion of the procedure. The patient tolerated the procedure well with no immediate complication evident. Jessie Dykes DO  11:34 PM, 6/14/21          Jessie Dykes DO  11:34 PM, 6/14/21      CONSULTS:  IP CONSULT TO NEUROLOGY    CRITICAL CARE:  Please see attending note    FINAL IMPRESSION      1. Altered mental status, unspecified altered mental status type    2. Acute respiratory failure, unspecified whether with hypoxia or hypercapnia ([facility] [address]          DISPOSITION / PLAN     DISPOSITION Decision To Transfer 06/14/2021 09:06:40 PM      PATIENT REFERRED TO:  No follow-up provider specified.     DISCHARGE MEDICATIONS:  New Prescriptions    No medications on file       Jessie Dykes DO  Emergency Medicine Resident    (Please note that portions of thisnote were completed with a voice recognition program.  Efforts were made to edit the dictations but occasionally words are mis-transcribed.)        Jessie Dykes DO  Resident  06/14/21 [id number]

## 2021-06-15 NOTE — FLOWSHEET NOTE
provided comfort and support to Richard Cantor pt's son he is coping and hopeful.  provided prayer in  Room 7 for Tri Jones (pt) unable to respond. 06/14/21 2038   Encounter Summary   Services provided to: Patient; Family   Referral/Consult From: Nursing Supervisor/Manager   Support System Children   Continue Visiting   (6/14/2021)   Complexity of Encounter High   Length of Encounter 30 minutes   Crisis   Type Code   Assessment Unable to respond   Intervention Prayer   Outcome Did not respond

## 2021-06-15 NOTE — ED NOTES
Patient's son informed [facility] that patient has known hx of seizures and could have possibly had a seizure.       Ailin Rao RN  06/14/21 [id number]

## 2021-06-15 NOTE — ED NOTES
PT REPORT  [facility] [address] / Joseph Estevez DEPARTED Hussein Kwon, RN  06/15/21 [id number]

## 2021-06-20 LAB
CULTURE: NORMAL
Lab: NORMAL
SPECIMEN DESCRIPTION: NORMAL

## 2021-06-21 LAB
CULTURE: NORMAL
Lab: NORMAL
SPECIMEN DESCRIPTION: NORMAL

## 2022-03-14 ENCOUNTER — APPOINTMENT (OUTPATIENT)
Dept: GENERAL RADIOLOGY | Age: 62
End: 2022-03-14
Payer: MEDICARE

## 2022-03-14 ENCOUNTER — HOSPITAL ENCOUNTER (EMERGENCY)
Age: 62
Discharge: HOME | End: 2022-03-14
Attending: EMERGENCY MEDICINE
Payer: MEDICARE

## 2022-03-14 VITALS
RESPIRATION RATE: 18 BRPM | WEIGHT: 100 LBS | HEART RATE: 83 BPM | SYSTOLIC BLOOD PRESSURE: 100 MMHG | TEMPERATURE: 98.1 F | DIASTOLIC BLOOD PRESSURE: 62 MMHG | OXYGEN SATURATION: 94 %

## 2022-03-14 DIAGNOSIS — S42.212A CLOSED DISPLACED FRACTURE OF SURGICAL NECK OF LEFT HUMERUS, UNSPECIFIED FRACTURE MORPHOLOGY, INITIAL ENCOUNTER: ICD-10-CM

## 2022-03-14 DIAGNOSIS — S42.292A CLOSED FRACTURE OF HEAD OF LEFT HUMERUS, INITIAL ENCOUNTER: Primary | ICD-10-CM

## 2022-03-14 PROCEDURE — 6360000002 HC RX W HCPCS: Performed by: EMERGENCY MEDICINE

## 2022-03-14 PROCEDURE — 96372 THER/PROPH/DIAG INJ SC/IM: CPT

## 2022-03-14 PROCEDURE — 73030 X-RAY EXAM OF SHOULDER: CPT

## 2022-03-14 PROCEDURE — 99285 EMERGENCY DEPT VISIT HI MDM: CPT

## 2022-03-14 RX ORDER — OXYCODONE HYDROCHLORIDE AND ACETAMINOPHEN 5; 325 MG/1; MG/1
1 TABLET ORAL EVERY 4 HOURS PRN
Qty: 10 TABLET | Refills: 0 | Status: SHIPPED | OUTPATIENT
Start: 2022-03-14 | End: 2022-03-17

## 2022-03-14 RX ADMIN — HYDROMORPHONE HYDROCHLORIDE 1 MG: 1 INJECTION, SOLUTION INTRAMUSCULAR; INTRAVENOUS; SUBCUTANEOUS at 16:35

## 2022-03-14 ASSESSMENT — PAIN SCALES - GENERAL: PAINLEVEL_OUTOF10: 10

## 2022-03-15 ASSESSMENT — ENCOUNTER SYMPTOMS
ABDOMINAL PAIN: 0
COUGH: 0

## 2022-03-16 NOTE — ED PROVIDER NOTES
[address]  EMERGENCY DEPARTMENT ENCOUNTER      Pt Name: Venkata Redding  MRN: [id number]  [place] 1960  Date of evaluation: 3/15/22    CHIEF COMPLAINT       Chief Complaint   Patient presents with    Shoulder Pain     HISTORY OF PRESENT ILLNESS   HPI 63 y.o. female presents with c/o shoulder pain. Pt reports that she fell and injured her left shoulder. Pain is severe in severity, constant in duration, sharp and throbbing in character. She hasn't had any medications for pain. Xr obtained at outside facility reportedly showed a fracture and she was sent to the emergency department for further evaluation . She denies any other pain or injury. REVIEW OF SYSTEMS       Review of Systems   Constitutional: Negative for fever. HENT: Negative for congestion. Eyes: Negative for visual disturbance. Respiratory: Negative for cough. Cardiovascular: Negative for chest pain. Gastrointestinal: Negative for abdominal pain. Musculoskeletal: Positive for arthralgias. Skin: Negative for rash and wound. Neurological: Positive for weakness (secondary to pain). Negative for numbness and headaches. PAST MEDICAL HISTORY   No past medical history on file. SURGICAL HISTORY     No past surgical history on file. CURRENT MEDICATIONS       Discharge Medication List as of 3/14/2022  5:44 PM          ALLERGIES     has No Known Allergies. FAMILY HISTORY     has no family status information on file.         SOCIAL HISTORY          PHYSICAL EXAM     INITIAL VITALS: /62   Pulse 83   Temp 98.1 °F (36.7 °C)   Resp 18   Wt 100 lb (45.4 kg)   SpO2 94%   Gen: ncat  Head: Normocephalic, atraumatic  Eye: Pupils equal round reactive to light, no conjunctivitis  ENT: MMM  Neck: no midline ttp JVD  Heart: Regular rate and rhythm no murmurs  Lungs: Clear to auscultation bilaterally, no respiratory distress  Chest wall: no clavicular ttp  Abdomen: Soft, nontender, nondistended, with no peritoneal signs  Neurologic: Patient is alert and oriented x3, fluent speech  Extremities: tenderness with rom at the left shoulder. She has appropriate flex / extension at the wrist, mcp, pip and dip joints. Appropriate sensation over the median, ulnar and radial dermatomes. Capillary refill appropriate. ROM at elbow and shoulder can not be assessed secondary to pain. MEDICAL DECISION MAKING:     MDM  63 y.o. female presenting with L. Shoulder fx. Xr obtained and showed an impacted comminuted humeral head and neck fracture. Pt provided analgesia. Put in a sling. Discussed with Dr. Richard Interiano. Pt will follow up with Dr. Lavelle Castillo in clinic. D/w pt treatment plan, warning precautions for prompt ED return and importance of close OP FU, she verbalizes understanding and agrees with the treatment plan. DIAGNOSTIC RESULTS     RADIOLOGY:All plain film, CT, MRI, and formal ultrasound images (except ED bedside ultrasound) are read by the radiologist and the images and interpretations are directly viewed by the emergency physician. XR SHOULDER LEFT (MIN 2 VIEWS)   Final Result   Fracture left humeral head and neck           EMERGENCY DEPARTMENT COURSE:   Vitals:    Vitals:    03/14/22 1543 03/14/22 1548 03/14/22 1848   BP: 100/62     Pulse: 83     Resp: 18     Temp:  98.1 °F (36.7 °C)    SpO2: 94%     Weight:   100 lb (45.4 kg)       The patient was given the following medications while in the emergency department:  Orders Placed This Encounter   Medications    HYDROmorphone (DILAUDID) injection 1 mg    oxyCODONE-acetaminophen (PERCOCET) 5-325 MG per tablet     Sig: Take 1 tablet by mouth every 4 hours as needed for Pain for up to 3 days. Intended supply: 3 days. Take lowest dose possible to manage pain     Dispense:  10 tablet     Refill:  0     -------------------------  CRITICAL CARE:   CONSULTS: IP CONSULT TO ORTHOPEDIC SURGERY  PROCEDURES: Procedures     FINAL IMPRESSION      1.  Closed fracture of head of left humerus, initial encounter    2. Closed displaced fracture of surgical neck of left humerus, unspecified fracture morphology, initial encounter          DISPOSITION/PLAN   DISPOSITION Decision To Discharge 03/14/2022 05:44:22 PM      PATIENT REFERRED TO:  Miriam Gresham MD  [address] [place] [address] [phone]    In 1 day      [facility] ED  [address]  [phone]    If symptoms worsen      DISCHARGE MEDICATIONS:  Discharge Medication List as of 3/14/2022  5:44 PM      START taking these medications    Details   oxyCODONE-acetaminophen (PERCOCET) 5-325 MG per tablet Take 1 tablet by mouth every 4 hours as needed for Pain for up to 3 days. Intended supply: 3 days.  Take lowest dose possible to manage pain, Disp-10 tablet, R-0Print               Cookie Cleveland MD  Attending Emergency Physician                      Cookie Cleveland MD  03/15/22 2233

## 2022-04-04 ENCOUNTER — OFFICE VISIT (OUTPATIENT)
Dept: ORTHOPEDIC SURGERY | Age: 62
End: 2022-04-04
Payer: MEDICARE

## 2022-04-04 VITALS — BODY MASS INDEX: 15.12 KG/M2 | HEIGHT: 59 IN | WEIGHT: 75 LBS

## 2022-04-04 DIAGNOSIS — S42.202A CLOSED FRACTURE OF PROXIMAL END OF LEFT HUMERUS, UNSPECIFIED FRACTURE MORPHOLOGY, INITIAL ENCOUNTER: Primary | ICD-10-CM

## 2022-04-04 DIAGNOSIS — M25.512 LEFT SHOULDER PAIN, UNSPECIFIED CHRONICITY: ICD-10-CM

## 2022-04-04 PROCEDURE — 99203 OFFICE O/P NEW LOW 30 MIN: CPT | Performed by: ORTHOPAEDIC SURGERY

## 2022-04-04 PROCEDURE — 23600 CLTX PROX HUMRL FX W/O MNPJ: CPT | Performed by: ORTHOPAEDIC SURGERY

## 2022-04-04 RX ORDER — LEVOTHYROXINE SODIUM 0.07 MG/1
75 TABLET ORAL 2 TIMES DAILY
COMMUNITY
Start: 2022-04-02

## 2022-04-04 NOTE — PROGRESS NOTES
ORTHOPEDIC PATIENT EVALUATION      HPI / Chief Complaint  Rao Pearl is a 63 y.o. female who presents for evaluation of her left shoulder. She lost her balance while walking 3 weeks ago and she fell hurting her left shoulder. She was seen in emergency department and diagnosed with a proximal humerus fracture. She was placed in a sling and referred to my clinic for further evaluation and treatment. She states that she has been a resident at the [address] [place] [address] for the past 10 years due to end-stage Parkinson's and Paget's. Her pain at this time is primarily localized to the left shoulder but does radiate distally to the level of the elbow. She denies having any associated numbness or tingling. Past Medical History  Angel Rodrigez  has no past medical history on file. Past Surgical History  Angel Rodrigez  has no past surgical history on file. Current Medications  No current outpatient medications on file. No current facility-administered medications for this visit. Allergies  Allergies have been reviewed. Angel Rodrigez is allergic to penicillins, ertapenem, naproxen, valproic acid, and lactose. Social History  Angel Rodrigez  reports that she has been smoking. She has been smoking about 0.25 packs per day. She has never used smokeless tobacco.    Family History  [facility] family history is not on file. Review of Systems   History obtained from the patient. REVIEW OF SYSTEMS:   Constitution: negative for fever, chills, weight loss or malaise   Musculoskeletal: As noted in the HPI   Neurologic: As noted in the HPI    Physical Exam  Ht 4' 11\" (1.499 m)   Wt 75 lb (34 kg)   BMI 15.15 kg/m²    General Appearance: alert, well appearing, and in no distress  Mental Status: alert, oriented to person, place, and time  Evaluation patient's left shoulder and upper extremity demonstrates intact skin without warmth, erythema, or significant swelling at this time.   She is tender to palpation diffusely about the shoulder and active and passive range of motion through the shoulder is limited due to pain. Sensation is grossly intact light touch in all dermatomes and she has a 2+ radial pulse with brisk capillary refill in her fingers. She can actively flex and extend her wrist as well as flex, extend, abduct and abduct all of her fingers. Imaging Studies  2 x-ray views of the left shoulder completed on 4/4/2022 were reviewed independently and compared to previous x-rays from 3/13/2022 demonstrating a fracture through the surgical neck with some varus angulation without significant displacement. There is no significant change in alignment compared to previous x-rays. No obvious dislocation or subluxation noted. Assessment and Plan  Amrik Montes is a 63 y.o. old female with a closed left proximal humerus fracture. She is in reasonable alignment and given her activity level and comorbidities I believe she can be managed appropriately conservatively. Consequently she is to remain in her sling for an additional week and then can wean out of the sling and start using this arm for light activities of daily living. A prescription for physical therapy was provided to start in a week working on gentle progressive range of motion. They can start working with her on strengthening in 5 weeks time. I had like to see her back for reevaluation in 5 weeks with repeat x-rays but she may return or call earlier with any questions or concerns. This note is created with the assistance of a speech recognition program.  While intending to generate adocument that actually reflects the content of the visit, the document can still have some errors including those of syntax and sound a like substitutions which may escape proof reading. It such instances, actual meaningcan be extrapolated by contextual diversion.

## 2023-01-03 ENCOUNTER — HOSPITAL ENCOUNTER (OUTPATIENT)
Age: 63
Setting detail: SPECIMEN
End: 2023-01-03
Payer: MEDICARE

## 2023-01-03 ENCOUNTER — HOSPITAL ENCOUNTER (OUTPATIENT)
Age: 63
Setting detail: SPECIMEN
Discharge: HOME | End: 2023-01-03
Payer: MEDICARE

## 2023-01-03 LAB
FLU A ANTIGEN: NEGATIVE
FLU B ANTIGEN: NEGATIVE

## 2023-01-03 PROCEDURE — 87804 INFLUENZA ASSAY W/OPTIC: CPT

## 2023-01-06 ENCOUNTER — HOSPITAL ENCOUNTER (OUTPATIENT)
Age: 63
Setting detail: SPECIMEN
Discharge: HOME | End: 2023-01-06

## 2023-01-06 LAB
ANION GAP SERPL CALCULATED.3IONS-SCNC: 12 MMOL/L (ref 9–17)
BUN BLDV-MCNC: 13 MG/DL (ref 8–23)
C-REACTIVE PROTEIN: <3 MG/L (ref 0–5)
CALCIUM SERPL-MCNC: 9.4 MG/DL (ref 8.6–10.4)
CHLORIDE BLD-SCNC: 105 MMOL/L (ref 98–107)
CO2: 23 MMOL/L (ref 20–31)
CREAT SERPL-MCNC: 0.55 MG/DL (ref 0.5–0.9)
GFR SERPL CREATININE-BSD FRML MDRD: >60 ML/MIN/1.73M2
GLUCOSE BLD-MCNC: 89 MG/DL (ref 70–99)
HCT VFR BLD CALC: 40.9 % (ref 36.3–47.1)
HEMOGLOBIN: 13.2 G/DL (ref 11.9–15.1)
MCH RBC QN AUTO: 31.9 PG (ref 25.2–33.5)
MCHC RBC AUTO-ENTMCNC: 32.3 G/DL (ref 28.4–34.8)
MCV RBC AUTO: 98.8 FL (ref 82.6–102.9)
NRBC AUTOMATED: 0 PER 100 WBC
PDW BLD-RTO: 13.4 % (ref 11.8–14.4)
PLATELET # BLD: 193 K/UL (ref 138–453)
PMV BLD AUTO: 11.5 FL (ref 8.1–13.5)
POTASSIUM SERPL-SCNC: 4.1 MMOL/L (ref 3.7–5.3)
RBC # BLD: 4.14 M/UL (ref 3.95–5.11)
SODIUM BLD-SCNC: 140 MMOL/L (ref 135–144)
VITAMIN D 25-HYDROXY: 75.6 NG/ML
WBC # BLD: 3.8 K/UL (ref 3.5–11.3)

## 2023-01-06 PROCEDURE — 85027 COMPLETE CBC AUTOMATED: CPT

## 2023-01-06 PROCEDURE — 82306 VITAMIN D 25 HYDROXY: CPT

## 2023-01-06 PROCEDURE — 86140 C-REACTIVE PROTEIN: CPT

## 2023-01-06 PROCEDURE — 80048 BASIC METABOLIC PNL TOTAL CA: CPT

## 2023-01-06 PROCEDURE — 36415 COLL VENOUS BLD VENIPUNCTURE: CPT

## 2023-01-06 PROCEDURE — P9603 ONE-WAY ALLOW PRORATED MILES: HCPCS

## 2023-01-23 ENCOUNTER — APPOINTMENT (OUTPATIENT)
Dept: GENERAL RADIOLOGY | Age: 63
DRG: 871 | End: 2023-01-23
Payer: MEDICARE

## 2023-01-23 ENCOUNTER — HOSPITAL ENCOUNTER (INPATIENT)
Age: 63
LOS: 3 days | Discharge: HOME | DRG: 871 | End: 2023-01-26
Attending: STUDENT IN AN ORGANIZED HEALTH CARE EDUCATION/TRAINING PROGRAM | Admitting: INTERNAL MEDICINE
Payer: MEDICARE

## 2023-01-23 ENCOUNTER — APPOINTMENT (OUTPATIENT)
Dept: CT IMAGING | Age: 63
DRG: 871 | End: 2023-01-23
Payer: MEDICARE

## 2023-01-23 DIAGNOSIS — I95.9 HYPOTENSION, UNSPECIFIED HYPOTENSION TYPE: Primary | ICD-10-CM

## 2023-01-23 DIAGNOSIS — K52.9 COLITIS: ICD-10-CM

## 2023-01-23 DIAGNOSIS — E16.2 HYPOGLYCEMIA: ICD-10-CM

## 2023-01-23 PROBLEM — A41.9 SEPTIC SHOCK (HCC): Status: ACTIVE | Noted: 2023-01-23

## 2023-01-23 PROBLEM — R65.21 SEPTIC SHOCK (HCC): Status: ACTIVE | Noted: 2023-01-23

## 2023-01-23 LAB
ABSOLUTE EOS #: 0.1 K/UL (ref 0–0.4)
ABSOLUTE LYMPH #: 1.8 K/UL (ref 1–4.8)
ABSOLUTE MONO #: 0.4 K/UL (ref 0.1–1.3)
ACETAMINOPHEN LEVEL: 11 UG/ML (ref 10–30)
ACETAMINOPHEN LEVEL: NORMAL UG/ML
ALBUMIN SERPL-MCNC: 3.8 G/DL (ref 3.5–5.2)
ALP BLD-CCNC: 55 U/L (ref 35–104)
ALT SERPL-CCNC: <5 U/L (ref 5–33)
ANION GAP SERPL CALCULATED.3IONS-SCNC: 19 MMOL/L (ref 9–17)
AST SERPL-CCNC: 15 U/L
BASOPHILS # BLD: 1 % (ref 0–2)
BASOPHILS ABSOLUTE: 0 K/UL (ref 0–0.2)
BILIRUB SERPL-MCNC: 0.2 MG/DL (ref 0.3–1.2)
BUN BLDV-MCNC: 15 MG/DL (ref 8–23)
CALCIUM SERPL-MCNC: 8.1 MG/DL (ref 8.6–10.4)
CHLORIDE BLD-SCNC: 103 MMOL/L (ref 98–107)
CO2: 18 MMOL/L (ref 20–31)
CREAT SERPL-MCNC: 0.41 MG/DL (ref 0.5–0.9)
EOSINOPHILS RELATIVE PERCENT: 2 % (ref 0–4)
ETHANOL PERCENT: 0.02 %
ETHANOL PERCENT: NORMAL %
ETHANOL: 17 MG/DL
ETHANOL: NORMAL MG/DL
GFR SERPL CREATININE-BSD FRML MDRD: >60 ML/MIN/1.73M2
GLUCOSE BLD-MCNC: 62 MG/DL (ref 70–99)
HCT VFR BLD CALC: 37.7 % (ref 36–46)
HEMOGLOBIN: 12.7 G/DL (ref 12–16)
INR BLD: 1
LACTIC ACID, SEPSIS: 4.3 MMOL/L (ref 0.5–1.9)
LIPASE: 29 U/L (ref 13–60)
LYMPHOCYTES # BLD: 40 % (ref 24–44)
MCH RBC QN AUTO: 31.8 PG (ref 26–34)
MCHC RBC AUTO-ENTMCNC: 33.6 G/DL (ref 31–37)
MCV RBC AUTO: 94.6 FL (ref 80–100)
MONOCYTES # BLD: 9 % (ref 1–7)
PDW BLD-RTO: 13 % (ref 11.5–14.9)
PLATELET # BLD: 247 K/UL (ref 150–450)
PMV BLD AUTO: 8.1 FL (ref 6–12)
POTASSIUM SERPL-SCNC: 3.3 MMOL/L (ref 3.7–5.3)
PRO-BNP: 68 PG/ML
PROCALCITONIN: 0.03 NG/ML
PROTHROMBIN TIME: 13.1 SEC (ref 11.8–14.6)
RBC # BLD: 3.99 M/UL (ref 4–5.2)
SALICYLATE LEVEL: <1 MG/DL (ref 3–10)
SALICYLATE LEVEL: NORMAL MG/DL
SEG NEUTROPHILS: 48 % (ref 36–66)
SEGMENTED NEUTROPHILS ABSOLUTE COUNT: 2.1 K/UL (ref 1.3–9.1)
SODIUM BLD-SCNC: 140 MMOL/L (ref 135–144)
TOTAL PROTEIN: 5.9 G/DL (ref 6.4–8.3)
TOXIC TRICYCLIC SC,BLOOD: NORMAL
TRICYCLIC ANTIDEP,URINE: NEGATIVE
TROPONIN, HIGH SENSITIVITY: 7 NG/L (ref 0–14)
TROPONIN, HIGH SENSITIVITY: 8 NG/L (ref 0–14)
WBC # BLD: 4.5 K/UL (ref 3.5–11)

## 2023-01-23 PROCEDURE — 93005 ELECTROCARDIOGRAM TRACING: CPT | Performed by: STUDENT IN AN ORGANIZED HEALTH CARE EDUCATION/TRAINING PROGRAM

## 2023-01-23 PROCEDURE — 87040 BLOOD CULTURE FOR BACTERIA: CPT

## 2023-01-23 PROCEDURE — 96361 HYDRATE IV INFUSION ADD-ON: CPT

## 2023-01-23 PROCEDURE — 84484 ASSAY OF TROPONIN QUANT: CPT

## 2023-01-23 PROCEDURE — 84145 PROCALCITONIN (PCT): CPT

## 2023-01-23 PROCEDURE — 6360000004 HC RX CONTRAST MEDICATION: Performed by: STUDENT IN AN ORGANIZED HEALTH CARE EDUCATION/TRAINING PROGRAM

## 2023-01-23 PROCEDURE — 80307 DRUG TEST PRSMV CHEM ANLYZR: CPT

## 2023-01-23 PROCEDURE — 87088 URINE BACTERIA CULTURE: CPT

## 2023-01-23 PROCEDURE — G0480 DRUG TEST DEF 1-7 CLASSES: HCPCS

## 2023-01-23 PROCEDURE — 36556 INSERT NON-TUNNEL CV CATH: CPT

## 2023-01-23 PROCEDURE — 99285 EMERGENCY DEPT VISIT HI MDM: CPT

## 2023-01-23 PROCEDURE — 81001 URINALYSIS AUTO W/SCOPE: CPT

## 2023-01-23 PROCEDURE — 87186 SC STD MICRODIL/AGAR DIL: CPT

## 2023-01-23 PROCEDURE — 83690 ASSAY OF LIPASE: CPT

## 2023-01-23 PROCEDURE — 83605 ASSAY OF LACTIC ACID: CPT

## 2023-01-23 PROCEDURE — 80179 DRUG ASSAY SALICYLATE: CPT

## 2023-01-23 PROCEDURE — 36415 COLL VENOUS BLD VENIPUNCTURE: CPT

## 2023-01-23 PROCEDURE — 80053 COMPREHEN METABOLIC PANEL: CPT

## 2023-01-23 PROCEDURE — 85610 PROTHROMBIN TIME: CPT

## 2023-01-23 PROCEDURE — 84443 ASSAY THYROID STIM HORMONE: CPT

## 2023-01-23 PROCEDURE — 51702 INSERT TEMP BLADDER CATH: CPT

## 2023-01-23 PROCEDURE — 74177 CT ABD & PELVIS W/CONTRAST: CPT

## 2023-01-23 PROCEDURE — 80143 DRUG ASSAY ACETAMINOPHEN: CPT

## 2023-01-23 PROCEDURE — 85025 COMPLETE CBC W/AUTO DIFF WBC: CPT

## 2023-01-23 PROCEDURE — 71045 X-RAY EXAM CHEST 1 VIEW: CPT

## 2023-01-23 PROCEDURE — 2000000000 HC ICU R&B

## 2023-01-23 PROCEDURE — 87086 URINE CULTURE/COLONY COUNT: CPT

## 2023-01-23 PROCEDURE — 83880 ASSAY OF NATRIURETIC PEPTIDE: CPT

## 2023-01-23 PROCEDURE — 2580000003 HC RX 258: Performed by: STUDENT IN AN ORGANIZED HEALTH CARE EDUCATION/TRAINING PROGRAM

## 2023-01-23 PROCEDURE — 84439 ASSAY OF FREE THYROXINE: CPT

## 2023-01-23 PROCEDURE — 96360 HYDRATION IV INFUSION INIT: CPT

## 2023-01-23 RX ORDER — 0.9 % SODIUM CHLORIDE 0.9 %
1000 INTRAVENOUS SOLUTION INTRAVENOUS ONCE
Status: COMPLETED | OUTPATIENT
Start: 2023-01-24 | End: 2023-01-24

## 2023-01-23 RX ORDER — FENTANYL CITRATE 0.05 MG/ML
25 INJECTION, SOLUTION INTRAMUSCULAR; INTRAVENOUS ONCE
Status: COMPLETED | OUTPATIENT
Start: 2023-01-24 | End: 2023-01-24

## 2023-01-23 RX ORDER — SODIUM CHLORIDE 0.9 % (FLUSH) 0.9 %
10 SYRINGE (ML) INJECTION PRN
Status: DISCONTINUED | OUTPATIENT
Start: 2023-01-23 | End: 2023-01-26 | Stop reason: HOSPADM

## 2023-01-23 RX ORDER — METRONIDAZOLE 500 MG/100ML
500 INJECTION, SOLUTION INTRAVENOUS ONCE
Status: COMPLETED | OUTPATIENT
Start: 2023-01-24 | End: 2023-01-24

## 2023-01-23 RX ORDER — DEXTROSE MONOHYDRATE 100 MG/ML
INJECTION, SOLUTION INTRAVENOUS CONTINUOUS PRN
Status: DISCONTINUED | OUTPATIENT
Start: 2023-01-23 | End: 2023-01-26 | Stop reason: HOSPADM

## 2023-01-23 RX ORDER — 0.9 % SODIUM CHLORIDE 0.9 %
1000 INTRAVENOUS SOLUTION INTRAVENOUS ONCE
Status: COMPLETED | OUTPATIENT
Start: 2023-01-23 | End: 2023-01-23

## 2023-01-23 RX ORDER — 0.9 % SODIUM CHLORIDE 0.9 %
100 INTRAVENOUS SOLUTION INTRAVENOUS ONCE
Status: COMPLETED | OUTPATIENT
Start: 2023-01-23 | End: 2023-01-23

## 2023-01-23 RX ADMIN — SODIUM CHLORIDE, PRESERVATIVE FREE 10 ML: 5 INJECTION INTRAVENOUS at 22:08

## 2023-01-23 RX ADMIN — SODIUM CHLORIDE 1000 ML: 9 INJECTION, SOLUTION INTRAVENOUS at 21:12

## 2023-01-23 RX ADMIN — IOPAMIDOL 75 ML: 755 INJECTION, SOLUTION INTRAVENOUS at 22:07

## 2023-01-23 RX ADMIN — SODIUM CHLORIDE 1000 ML: 9 INJECTION, SOLUTION INTRAVENOUS at 23:56

## 2023-01-23 RX ADMIN — DEXTROSE MONOHYDRATE 125 ML: 100 INJECTION, SOLUTION INTRAVENOUS at 22:25

## 2023-01-23 RX ADMIN — SODIUM CHLORIDE 100 ML: 9 INJECTION, SOLUTION INTRAVENOUS at 22:08

## 2023-01-23 ASSESSMENT — LIFESTYLE VARIABLES: HOW OFTEN DO YOU HAVE A DRINK CONTAINING ALCOHOL: NEVER

## 2023-01-23 NOTE — Clinical Note
Discharge Plan[de-identified] Extended Care Facility (e.g. Adult Home, Nursing Home, etc.) Comment: assisted living   Telemetry/Cardiac Monitoring Required?: Yes

## 2023-01-24 ENCOUNTER — APPOINTMENT (OUTPATIENT)
Dept: ULTRASOUND IMAGING | Age: 63
DRG: 871 | End: 2023-01-24
Payer: MEDICARE

## 2023-01-24 PROBLEM — E03.9 HYPOTHYROIDISM: Status: ACTIVE | Noted: 2023-01-24

## 2023-01-24 PROBLEM — E86.1 HYPOVOLEMIA: Status: ACTIVE | Noted: 2023-01-24

## 2023-01-24 PROBLEM — Z87.898 HISTORY OF SEIZURES: Status: ACTIVE | Noted: 2023-01-24

## 2023-01-24 LAB
ABSOLUTE EOS #: 0.1 K/UL (ref 0–0.4)
ABSOLUTE LYMPH #: 1.5 K/UL (ref 1–4.8)
ABSOLUTE MONO #: 0.4 K/UL (ref 0.1–1.3)
AMPHETAMINE SCREEN URINE: NEGATIVE
ANION GAP SERPL CALCULATED.3IONS-SCNC: 6 MMOL/L (ref 9–17)
ANION GAP SERPL CALCULATED.3IONS-SCNC: 8 MMOL/L (ref 9–17)
BACTERIA: ABNORMAL
BARBITURATE SCREEN URINE: POSITIVE
BASOPHILS # BLD: 1 % (ref 0–2)
BASOPHILS ABSOLUTE: 0 K/UL (ref 0–0.2)
BENZODIAZEPINE SCREEN, URINE: NEGATIVE
BILIRUBIN URINE: NEGATIVE
BUN BLDV-MCNC: 11 MG/DL (ref 8–23)
BUN BLDV-MCNC: 11 MG/DL (ref 8–23)
CALCIUM IONIZED: 1.14 MMOL/L (ref 1.1–1.33)
CALCIUM SERPL-MCNC: 7.2 MG/DL (ref 8.6–10.4)
CALCIUM SERPL-MCNC: 7.4 MG/DL (ref 8.6–10.4)
CANNABINOID SCREEN URINE: NEGATIVE
CASTS UA: ABNORMAL /LPF
CHLORIDE BLD-SCNC: 111 MMOL/L (ref 98–107)
CHLORIDE BLD-SCNC: 113 MMOL/L (ref 98–107)
CHP ED QC CHECK: YES
CHP ED QC CHECK: YES
CO2: 20 MMOL/L (ref 20–31)
CO2: 21 MMOL/L (ref 20–31)
COCAINE METABOLITE, URINE: NEGATIVE
COLOR: YELLOW
CORTISOL COLLECTION INFO: NORMAL
CORTISOL: 8.1 UG/DL (ref 2.7–18.4)
CREAT SERPL-MCNC: 0.41 MG/DL (ref 0.5–0.9)
CREAT SERPL-MCNC: 0.46 MG/DL (ref 0.5–0.9)
EKG ATRIAL RATE: 60 BPM
EKG P AXIS: 44 DEGREES
EKG P-R INTERVAL: 174 MS
EKG Q-T INTERVAL: 450 MS
EKG QRS DURATION: 82 MS
EKG QTC CALCULATION (BAZETT): 450 MS
EKG R AXIS: 51 DEGREES
EKG T AXIS: 58 DEGREES
EKG VENTRICULAR RATE: 60 BPM
EOSINOPHILS RELATIVE PERCENT: 2 % (ref 0–4)
EPITHELIAL CELLS UA: ABNORMAL /HPF
FENTANYL URINE: NEGATIVE
GFR SERPL CREATININE-BSD FRML MDRD: >60 ML/MIN/1.73M2
GFR SERPL CREATININE-BSD FRML MDRD: >60 ML/MIN/1.73M2
GLUCOSE BLD-MCNC: 139 MG/DL (ref 65–105)
GLUCOSE BLD-MCNC: 274 MG/DL
GLUCOSE BLD-MCNC: 274 MG/DL (ref 65–105)
GLUCOSE BLD-MCNC: 74 MG/DL
GLUCOSE BLD-MCNC: 74 MG/DL (ref 65–105)
GLUCOSE BLD-MCNC: 82 MG/DL (ref 70–99)
GLUCOSE BLD-MCNC: 87 MG/DL (ref 70–99)
GLUCOSE URINE: ABNORMAL
HCT VFR BLD CALC: 37.7 % (ref 36–46)
HEMOGLOBIN: 12.7 G/DL (ref 12–16)
KETONES, URINE: NEGATIVE
LACTIC ACID, SEPSIS: 2.4 MMOL/L (ref 0.5–1.9)
LACTIC ACID: 0.9 MMOL/L (ref 0.5–2.2)
LACTIC ACID: 1.3 MMOL/L (ref 0.5–2.2)
LEUKOCYTE ESTERASE, URINE: NEGATIVE
LYMPHOCYTES # BLD: 26 % (ref 24–44)
MCH RBC QN AUTO: 31.7 PG (ref 26–34)
MCHC RBC AUTO-ENTMCNC: 33.7 G/DL (ref 31–37)
MCV RBC AUTO: 94 FL (ref 80–100)
METHADONE SCREEN, URINE: NEGATIVE
MONOCYTES # BLD: 8 % (ref 1–7)
NITRITE, URINE: NEGATIVE
OPIATES, URINE: POSITIVE
OXYCODONE SCREEN URINE: POSITIVE
PDW BLD-RTO: 13.1 % (ref 11.5–14.9)
PH UA: 5.5 (ref 5–8)
PHENCYCLIDINE, URINE: NEGATIVE
PLATELET # BLD: 274 K/UL (ref 150–450)
PMV BLD AUTO: 8.2 FL (ref 6–12)
POTASSIUM SERPL-SCNC: 4.9 MMOL/L (ref 3.7–5.3)
POTASSIUM SERPL-SCNC: 5.5 MMOL/L (ref 3.7–5.3)
PROTEIN UA: NEGATIVE
RBC # BLD: 4.01 M/UL (ref 4–5.2)
RBC UA: ABNORMAL /HPF
REASON FOR REJECTION: NORMAL
REASON FOR REJECTION: NORMAL
SEG NEUTROPHILS: 63 % (ref 36–66)
SEGMENTED NEUTROPHILS ABSOLUTE COUNT: 3.5 K/UL (ref 1.3–9.1)
SODIUM BLD-SCNC: 137 MMOL/L (ref 135–144)
SODIUM BLD-SCNC: 142 MMOL/L (ref 135–144)
SPECIFIC GRAVITY UA: 1.02 (ref 1–1.03)
TEST INFORMATION: ABNORMAL
THYROXINE, FREE: 1.27 NG/DL (ref 0.93–1.7)
TSH SERPL DL<=0.05 MIU/L-ACNC: 0.27 UIU/ML (ref 0.3–5)
TSH SERPL DL<=0.05 MIU/L-ACNC: 0.53 UIU/ML (ref 0.3–5)
TURBIDITY: CLEAR
URINE HGB: NEGATIVE
UROBILINOGEN, URINE: NORMAL
WBC # BLD: 5.6 K/UL (ref 3.5–11)
WBC UA: ABNORMAL /HPF
ZZ NTE CLEAN UP: ORDERED TEST: NORMAL
ZZ NTE CLEAN UP: ORDERED TEST: NORMAL
ZZ NTE WITH NAME CLEAN UP: SPECIMEN SOURCE: NORMAL
ZZ NTE WITH NAME CLEAN UP: SPECIMEN SOURCE: NORMAL

## 2023-01-24 PROCEDURE — 2580000003 HC RX 258: Performed by: STUDENT IN AN ORGANIZED HEALTH CARE EDUCATION/TRAINING PROGRAM

## 2023-01-24 PROCEDURE — 6360000002 HC RX W HCPCS

## 2023-01-24 PROCEDURE — 2580000003 HC RX 258

## 2023-01-24 PROCEDURE — 6360000002 HC RX W HCPCS: Performed by: NURSE PRACTITIONER

## 2023-01-24 PROCEDURE — 6370000000 HC RX 637 (ALT 250 FOR IP)

## 2023-01-24 PROCEDURE — 2500000003 HC RX 250 WO HCPCS: Performed by: NURSE PRACTITIONER

## 2023-01-24 PROCEDURE — 2580000003 HC RX 258: Performed by: NURSE PRACTITIONER

## 2023-01-24 PROCEDURE — 76705 ECHO EXAM OF ABDOMEN: CPT

## 2023-01-24 PROCEDURE — 2500000003 HC RX 250 WO HCPCS: Performed by: STUDENT IN AN ORGANIZED HEALTH CARE EDUCATION/TRAINING PROGRAM

## 2023-01-24 PROCEDURE — 2580000003 HC RX 258: Performed by: INTERNAL MEDICINE

## 2023-01-24 PROCEDURE — 80048 BASIC METABOLIC PNL TOTAL CA: CPT

## 2023-01-24 PROCEDURE — 02HV33Z INSERTION OF INFUSION DEVICE INTO SUPERIOR VENA CAVA, PERCUTANEOUS APPROACH: ICD-10-PCS | Performed by: INTERNAL MEDICINE

## 2023-01-24 PROCEDURE — 84443 ASSAY THYROID STIM HORMONE: CPT

## 2023-01-24 PROCEDURE — 82533 TOTAL CORTISOL: CPT

## 2023-01-24 PROCEDURE — 99291 CRITICAL CARE FIRST HOUR: CPT | Performed by: INTERNAL MEDICINE

## 2023-01-24 PROCEDURE — 36415 COLL VENOUS BLD VENIPUNCTURE: CPT

## 2023-01-24 PROCEDURE — 99223 1ST HOSP IP/OBS HIGH 75: CPT | Performed by: INTERNAL MEDICINE

## 2023-01-24 PROCEDURE — C9113 INJ PANTOPRAZOLE SODIUM, VIA: HCPCS

## 2023-01-24 PROCEDURE — 85025 COMPLETE CBC W/AUTO DIFF WBC: CPT

## 2023-01-24 PROCEDURE — 6360000002 HC RX W HCPCS: Performed by: INTERNAL MEDICINE

## 2023-01-24 PROCEDURE — 82330 ASSAY OF CALCIUM: CPT

## 2023-01-24 PROCEDURE — 2000000000 HC ICU R&B

## 2023-01-24 PROCEDURE — 93010 ELECTROCARDIOGRAM REPORT: CPT | Performed by: INTERNAL MEDICINE

## 2023-01-24 PROCEDURE — 83605 ASSAY OF LACTIC ACID: CPT

## 2023-01-24 PROCEDURE — 82947 ASSAY GLUCOSE BLOOD QUANT: CPT

## 2023-01-24 PROCEDURE — 6360000002 HC RX W HCPCS: Performed by: STUDENT IN AN ORGANIZED HEALTH CARE EDUCATION/TRAINING PROGRAM

## 2023-01-24 PROCEDURE — A4216 STERILE WATER/SALINE, 10 ML: HCPCS

## 2023-01-24 PROCEDURE — 6370000000 HC RX 637 (ALT 250 FOR IP): Performed by: NURSE PRACTITIONER

## 2023-01-24 RX ORDER — SCOLOPAMINE TRANSDERMAL SYSTEM 1 MG/1
1 PATCH, EXTENDED RELEASE TRANSDERMAL
Status: DISCONTINUED | OUTPATIENT
Start: 2023-01-24 | End: 2023-01-24

## 2023-01-24 RX ORDER — LEVOTHYROXINE SODIUM 0.07 MG/1
75 TABLET ORAL 2 TIMES DAILY
Status: DISCONTINUED | OUTPATIENT
Start: 2023-01-24 | End: 2023-01-24

## 2023-01-24 RX ORDER — CLONAZEPAM 1 MG/1
1 TABLET ORAL 2 TIMES DAILY
COMMUNITY

## 2023-01-24 RX ORDER — MAGNESIUM OXIDE 400 MG/1
400 TABLET ORAL DAILY
COMMUNITY

## 2023-01-24 RX ORDER — POTASSIUM CHLORIDE 20 MEQ/1
40 TABLET, EXTENDED RELEASE ORAL PRN
Status: DISCONTINUED | OUTPATIENT
Start: 2023-01-24 | End: 2023-01-24

## 2023-01-24 RX ORDER — BENZTROPINE MESYLATE 2 MG/1
2 TABLET ORAL 2 TIMES DAILY
Status: DISCONTINUED | OUTPATIENT
Start: 2023-01-24 | End: 2023-01-26 | Stop reason: HOSPADM

## 2023-01-24 RX ORDER — LINACLOTIDE 290 UG/1
290 CAPSULE, GELATIN COATED ORAL
COMMUNITY

## 2023-01-24 RX ORDER — SODIUM CHLORIDE 9 MG/ML
INJECTION, SOLUTION INTRAVENOUS CONTINUOUS
Status: DISCONTINUED | OUTPATIENT
Start: 2023-01-24 | End: 2023-01-25

## 2023-01-24 RX ORDER — SODIUM CHLORIDE 9 MG/ML
INJECTION, SOLUTION INTRAVENOUS PRN
Status: DISCONTINUED | OUTPATIENT
Start: 2023-01-24 | End: 2023-01-26 | Stop reason: HOSPADM

## 2023-01-24 RX ORDER — ONDANSETRON 2 MG/ML
4 INJECTION INTRAMUSCULAR; INTRAVENOUS EVERY 6 HOURS PRN
Status: DISCONTINUED | OUTPATIENT
Start: 2023-01-24 | End: 2023-01-26 | Stop reason: HOSPADM

## 2023-01-24 RX ORDER — SODIUM CHLORIDE 0.9 % (FLUSH) 0.9 %
10 SYRINGE (ML) INJECTION PRN
Status: DISCONTINUED | OUTPATIENT
Start: 2023-01-24 | End: 2023-01-26 | Stop reason: HOSPADM

## 2023-01-24 RX ORDER — ENOXAPARIN SODIUM 100 MG/ML
30 INJECTION SUBCUTANEOUS DAILY
Status: DISCONTINUED | OUTPATIENT
Start: 2023-01-24 | End: 2023-01-26 | Stop reason: HOSPADM

## 2023-01-24 RX ORDER — BENZTROPINE MESYLATE 2 MG/1
2 TABLET ORAL 2 TIMES DAILY
COMMUNITY

## 2023-01-24 RX ORDER — POTASSIUM CHLORIDE 20 MEQ/1
20 TABLET, EXTENDED RELEASE ORAL DAILY
COMMUNITY

## 2023-01-24 RX ORDER — SODIUM CHLORIDE 0.9 % (FLUSH) 0.9 %
5-40 SYRINGE (ML) INJECTION EVERY 12 HOURS SCHEDULED
Status: DISCONTINUED | OUTPATIENT
Start: 2023-01-24 | End: 2023-01-26 | Stop reason: HOSPADM

## 2023-01-24 RX ORDER — ONDANSETRON 4 MG/1
4 TABLET, ORALLY DISINTEGRATING ORAL EVERY 8 HOURS PRN
Status: DISCONTINUED | OUTPATIENT
Start: 2023-01-24 | End: 2023-01-26 | Stop reason: HOSPADM

## 2023-01-24 RX ORDER — ACETAMINOPHEN 650 MG/1
650 SUPPOSITORY RECTAL EVERY 6 HOURS PRN
Status: DISCONTINUED | OUTPATIENT
Start: 2023-01-24 | End: 2023-01-26 | Stop reason: HOSPADM

## 2023-01-24 RX ORDER — SODIUM CHLORIDE 9 MG/ML
INJECTION, SOLUTION INTRAVENOUS CONTINUOUS
Status: DISCONTINUED | OUTPATIENT
Start: 2023-01-24 | End: 2023-01-24

## 2023-01-24 RX ORDER — ACETAMINOPHEN 325 MG/1
650 TABLET ORAL EVERY 6 HOURS PRN
Status: DISCONTINUED | OUTPATIENT
Start: 2023-01-24 | End: 2023-01-26 | Stop reason: HOSPADM

## 2023-01-24 RX ORDER — ONDANSETRON 4 MG/1
4 TABLET, FILM COATED ORAL EVERY 8 HOURS PRN
COMMUNITY

## 2023-01-24 RX ORDER — METHOCARBAMOL 750 MG/1
750 TABLET, FILM COATED ORAL DAILY PRN
COMMUNITY

## 2023-01-24 RX ORDER — AMOXICILLIN 250 MG
2 CAPSULE ORAL DAILY
COMMUNITY

## 2023-01-24 RX ORDER — FENTANYL CITRATE 0.05 MG/ML
25 INJECTION, SOLUTION INTRAMUSCULAR; INTRAVENOUS
Status: DISCONTINUED | OUTPATIENT
Start: 2023-01-24 | End: 2023-01-26

## 2023-01-24 RX ORDER — PHENOL 1.4 %
1 AEROSOL, SPRAY (ML) MUCOUS MEMBRANE DAILY
COMMUNITY

## 2023-01-24 RX ORDER — ROPINIROLE 0.5 MG/1
0.5 TABLET, FILM COATED ORAL 3 TIMES DAILY
COMMUNITY

## 2023-01-24 RX ORDER — LEVOTHYROXINE SODIUM 0.07 MG/1
37.5 TABLET ORAL DAILY
Status: DISCONTINUED | OUTPATIENT
Start: 2023-01-25 | End: 2023-01-26 | Stop reason: HOSPADM

## 2023-01-24 RX ORDER — METRONIDAZOLE 500 MG/100ML
500 INJECTION, SOLUTION INTRAVENOUS EVERY 8 HOURS
Status: DISCONTINUED | OUTPATIENT
Start: 2023-01-24 | End: 2023-01-26

## 2023-01-24 RX ORDER — POTASSIUM CHLORIDE 7.45 MG/ML
10 INJECTION INTRAVENOUS PRN
Status: DISCONTINUED | OUTPATIENT
Start: 2023-01-24 | End: 2023-01-24

## 2023-01-24 RX ORDER — DROPERIDOL 2.5 MG/ML
0.62 INJECTION, SOLUTION INTRAMUSCULAR; INTRAVENOUS EVERY 4 HOURS PRN
Status: DISCONTINUED | OUTPATIENT
Start: 2023-01-24 | End: 2023-01-26 | Stop reason: HOSPADM

## 2023-01-24 RX ORDER — OMEPRAZOLE 20 MG/1
20 CAPSULE, DELAYED RELEASE ORAL DAILY
COMMUNITY

## 2023-01-24 RX ORDER — ROPINIROLE 0.5 MG/1
0.5 TABLET, FILM COATED ORAL 3 TIMES DAILY
Status: DISCONTINUED | OUTPATIENT
Start: 2023-01-24 | End: 2023-01-26 | Stop reason: HOSPADM

## 2023-01-24 RX ORDER — POTASSIUM CHLORIDE 7.45 MG/ML
10 INJECTION INTRAVENOUS PRN
Status: DISCONTINUED | OUTPATIENT
Start: 2023-01-24 | End: 2023-01-26 | Stop reason: HOSPADM

## 2023-01-24 RX ORDER — NOREPINEPHRINE BIT/0.9 % NACL 16MG/250ML
1-100 INFUSION BOTTLE (ML) INTRAVENOUS CONTINUOUS
Status: DISCONTINUED | OUTPATIENT
Start: 2023-01-24 | End: 2023-01-26 | Stop reason: HOSPADM

## 2023-01-24 RX ADMIN — METRONIDAZOLE 500 MG: 500 INJECTION, SOLUTION INTRAVENOUS at 01:23

## 2023-01-24 RX ADMIN — CEFEPIME 2000 MG: 2 INJECTION, POWDER, FOR SOLUTION INTRAVENOUS at 09:45

## 2023-01-24 RX ADMIN — POTASSIUM CHLORIDE 10 MEQ: 7.46 INJECTION, SOLUTION INTRAVENOUS at 01:55

## 2023-01-24 RX ADMIN — VANCOMYCIN HYDROCHLORIDE 750 MG: 750 INJECTION, POWDER, LYOPHILIZED, FOR SOLUTION INTRAVENOUS at 02:30

## 2023-01-24 RX ADMIN — ENOXAPARIN SODIUM 30 MG: 100 INJECTION SUBCUTANEOUS at 08:02

## 2023-01-24 RX ADMIN — SODIUM CHLORIDE, PRESERVATIVE FREE 10 ML: 5 INJECTION INTRAVENOUS at 08:03

## 2023-01-24 RX ADMIN — POTASSIUM CHLORIDE 10 MEQ: 7.46 INJECTION, SOLUTION INTRAVENOUS at 05:05

## 2023-01-24 RX ADMIN — METRONIDAZOLE 500 MG: 500 INJECTION, SOLUTION INTRAVENOUS at 16:02

## 2023-01-24 RX ADMIN — METRONIDAZOLE 500 MG: 500 INJECTION, SOLUTION INTRAVENOUS at 23:53

## 2023-01-24 RX ADMIN — SODIUM CHLORIDE: 9 INJECTION, SOLUTION INTRAVENOUS at 14:45

## 2023-01-24 RX ADMIN — SODIUM CHLORIDE: 9 INJECTION, SOLUTION INTRAVENOUS at 08:13

## 2023-01-24 RX ADMIN — PANTOPRAZOLE SODIUM 40 MG: 40 INJECTION, POWDER, FOR SOLUTION INTRAVENOUS at 08:02

## 2023-01-24 RX ADMIN — SODIUM CHLORIDE, PRESERVATIVE FREE 10 ML: 5 INJECTION INTRAVENOUS at 20:45

## 2023-01-24 RX ADMIN — POTASSIUM CHLORIDE 10 MEQ: 7.46 INJECTION, SOLUTION INTRAVENOUS at 03:40

## 2023-01-24 RX ADMIN — CEFTRIAXONE SODIUM 1000 MG: 1 INJECTION, POWDER, FOR SOLUTION INTRAMUSCULAR; INTRAVENOUS at 13:12

## 2023-01-24 RX ADMIN — DEXTROSE MONOHYDRATE 250 ML: 100 INJECTION, SOLUTION INTRAVENOUS at 01:07

## 2023-01-24 RX ADMIN — METRONIDAZOLE 500 MG: 500 INJECTION, SOLUTION INTRAVENOUS at 08:16

## 2023-01-24 RX ADMIN — FENTANYL CITRATE 25 MCG: 0.05 INJECTION, SOLUTION INTRAMUSCULAR; INTRAVENOUS at 11:54

## 2023-01-24 RX ADMIN — FENTANYL CITRATE 25 MCG: 0.05 INJECTION, SOLUTION INTRAMUSCULAR; INTRAVENOUS at 08:20

## 2023-01-24 RX ADMIN — FENTANYL CITRATE 25 MCG: 0.05 INJECTION, SOLUTION INTRAMUSCULAR; INTRAVENOUS at 00:08

## 2023-01-24 RX ADMIN — Medication 1 MCG/MIN: at 02:18

## 2023-01-24 RX ADMIN — SODIUM CHLORIDE: 9 INJECTION, SOLUTION INTRAVENOUS at 01:04

## 2023-01-24 RX ADMIN — POTASSIUM CHLORIDE 10 MEQ: 7.46 INJECTION, SOLUTION INTRAVENOUS at 06:16

## 2023-01-24 RX ADMIN — ACETAMINOPHEN 650 MG: 325 TABLET ORAL at 20:41

## 2023-01-24 RX ADMIN — CEFEPIME 2000 MG: 2 INJECTION, POWDER, FOR SOLUTION INTRAVENOUS at 01:03

## 2023-01-24 RX ADMIN — ROPINIROLE HYDROCHLORIDE 0.5 MG: 1 TABLET, FILM COATED ORAL at 20:41

## 2023-01-24 RX ADMIN — FENTANYL CITRATE 25 MCG: 0.05 INJECTION, SOLUTION INTRAMUSCULAR; INTRAVENOUS at 03:43

## 2023-01-24 RX ADMIN — ONDANSETRON 4 MG: 2 INJECTION INTRAMUSCULAR; INTRAVENOUS at 02:10

## 2023-01-24 RX ADMIN — FENTANYL CITRATE 25 MCG: 0.05 INJECTION, SOLUTION INTRAMUSCULAR; INTRAVENOUS at 15:56

## 2023-01-24 RX ADMIN — BENZTROPINE MESYLATE 2 MG: 2 TABLET ORAL at 20:41

## 2023-01-24 ASSESSMENT — PAIN SCALES - GENERAL
PAINLEVEL_OUTOF10: 9
PAINLEVEL_OUTOF10: 10

## 2023-01-24 ASSESSMENT — PAIN DESCRIPTION - LOCATION
LOCATION: ABDOMEN;BACK
LOCATION: ABDOMEN
LOCATION: ABDOMEN
LOCATION: BACK;GENERALIZED

## 2023-01-24 ASSESSMENT — ENCOUNTER SYMPTOMS
CONSTIPATION: 0
ABDOMINAL DISTENTION: 0
VOMITING: 0
EYES NEGATIVE: 1
DIARRHEA: 1
NAUSEA: 0
WHEEZING: 0
SINUS PAIN: 0
SINUS PRESSURE: 0
CHEST TIGHTNESS: 0
RECTAL PAIN: 0
RESPIRATORY NEGATIVE: 1
COLOR CHANGE: 0
ANAL BLEEDING: 0
EYE REDNESS: 0
SORE THROAT: 0
SHORTNESS OF BREATH: 0
COUGH: 0
BACK PAIN: 1
BLOOD IN STOOL: 1
ABDOMINAL PAIN: 1
EYE PAIN: 0
EYE DISCHARGE: 0
EYE ITCHING: 0

## 2023-01-24 ASSESSMENT — PAIN DESCRIPTION - DESCRIPTORS
DESCRIPTORS: ACHING
DESCRIPTORS: CRAMPING
DESCRIPTORS: ACHING

## 2023-01-24 ASSESSMENT — PAIN DESCRIPTION - ORIENTATION
ORIENTATION: OTHER (COMMENT)

## 2023-01-24 NOTE — CARE COORDINATION
CASE MANAGEMENT NOTE:    Admission Date:  1/23/2023 Marlin Valentine is a 63 y.o.  female    Admitted for : Colitis [K52.9]  Hypoglycemia [E16.2]  Septic shock ([facility] [address] [A41.9, R65.21]  Hypotension, unspecified hypotension type [I95.9]    Met with:  Patient    PCP:  PCP  at . [address]:  [address] Medicare       Is patient alert and oriented at time of discussion:  Yes    Current Residence/ Living Arrangements:  in assisted living facility independently    [facility]. Current Services PTA:  No    Does patient go to outpatient dialysis: No  If yes, location and chair time: NA  Who is their nephrologist? NA    Is patient agreeable to VNS: No    Freedom of choice provided:  No    List of [address] provided: No    VNS chosen:  No    DME:  wheelchair    Home Oxygen: No    Nebulizer: No    CPAP/BIPAP: No    Supplier: N/A    Potential Assistance Needed: No    SNF needed: No    Freedom of choice and list provided: No    Pharmacy:  [facility]       Is patient currently receiving oral anticoagulation therapy? No    Is the Patient an [facility] with Readmission Risk Score greater than 14%? No  If yes, pt needs a follow up appointment made within 7 days. Family Members/Caregivers that are willing and able to care for patient at home:    No    If yes, list name here:      Family/caregivers educated on resources available including DME and respite care: No    Transportation Provider:  ambulette             Discharge Plan:  1/24/23 [facility] MEDICARE from [address] [place] [address] is to return at discharge. DME: wheelchair VNS:none  IV cefepime, Flagyl, Vanco and Levophed gtt@ 1mcg.  Following for needs//JF                           Electronically signed by: Alisa Alonzo RN on 1/24/2023 at 8:04 AM

## 2023-01-24 NOTE — PROGRESS NOTES
Antonio Nelson is a 63 y.o. Non- / non  female who presents with Fatigue and Other (Hypotension)   and is admitted to the hospital for the management of Colitis. According to patient, she has been alone in her room at her assisted living unit for the past 3 and half weeks. Patient states that the staff told her she had the stomach flu and needed to stay in her room. Patient reports that she has had abdominal pain, nausea, and intermittent episodes of vomiting for the past 3 and half weeks. ED provider reports that patient's friend visited her today and called EMS. Upon their arrival, BP was noted to be 60s over 30s. They administered 2 L of fluid in route. Symptoms are associated with myalgias, chills, and fever. States that her temperature was 101 °F couple of days ago. Denies chest pain, cough, and urinary symptoms. There are no aggravating or alleviating factors. Symptoms are reported as Constant and moderate; ongoing for 3.5 weeks. Patient initially responded to fluid bolus; however, at time of admission, patient was again hypotensive. TLC inserted in right groin per ED resident. Admission order changed to admit to ICU for septic shock.     Patient status inpatient in the Medical ICU    Septic Shock  -4 L Fluid bolus administered in ED  -IV antibiotics initiated  -- Cefepime, Flagyl, and vancomycin (pharmacy to dose)  -Blood culture pending x2  -UA negative for UTI  --Urine culture pending  -Lactate 4.3, then 2.4; recheck pending  -WBC 4.5; procalcitonin 0.03    Colitis  -CT chest/abdomen/pelvis -mucosal enhancement, moderate wall thickening, and surrounding inflammatory stranding of the ascending colon compatible with colitis  --Minimal intrahepatic biliary ductal prominence without CBD dilation  --Consider correlation with LFTs if clinically indicated  --No evidence of left hilar mass  --Age-indeterminate compression deformities of L2 and L3 vertebral bodies  -IV Flagyl, cefepime, and vancomycin initiated in ED  --Continue on admission  -NPO for now  -IVF infusing  -pain and nausea control  -Consult general surgery    Hypokalemia  -K+ 3.3  -K+ replacement protocol  -recheck BMP in am         Disposition 3  days      Consultations:   IP CONSULT TO INTERNAL MEDICINE  IP CONSULT TO CRITICAL CARE  IP CONSULT TO [place]    Patient is admitted as inpatient status because of co-morbidities listed above, severity of signs and symptoms as outlined, requirement for current medical therapies and most importantly because of direct risk to patient if care not provided in a hospital setting. Expected length of stay > 48 hours.     Hernan Ramos, APRN - CNP  1/24/2023  3:29 AM

## 2023-01-24 NOTE — ED NOTES
Dr. Cinthia Dos Santos informed rectal temp 93.7 and blood pressure 73/29 and another liter of normal saline ordered and patient to get a central line.      Clarissa James RN  01/24/23 4142

## 2023-01-24 NOTE — PROGRESS NOTES
Vancomycin Dosing by Pharmacy - Initial Note   TODAY'S DATE:  1/24/2023  Patient name, age:  Polo Randle, 63 y.o. Indication: Sepsis of unknown origin, empiric  Additional antimicrobials:  cefepime, flagyl    Allergies:  Penicillins, Ertapenem, Naproxen, Valproic acid, and Lactose   Actual Weight:    Wt Readings from Last 1 Encounters:   01/24/23 104 lb 8 oz (47.4 kg)     Labs/Ancillary Data  Estimated Creatinine Clearance: 109 mL/min (A) (based on SCr of 0.41 mg/dL (L)). Recent Labs     01/23/23 2127   CREATININE 0.41*   BUN 15   WBC 4.5     Procalcitonin   Date Value Ref Range Status   01/23/2023 0.03 <0.09 ng/mL Final     Comment:           Suspected Sepsis:  <0.50 ng/mL     Low likelihood of sepsis. 0.50-2.00 ng/mL     Increased likelihood of sepsis. Antibiotics encouraged. >2.00 ng/mL     High risk of sepsis/shock. Antibiotics strongly encouraged. Suspected Lower Resp Tract Infections:  <0.24 ng/mL     Low likelihood of bacterial infection. >0.24 ng/mL     Increased likelihood of bacterial infection. Antibiotics encouraged. With successful antibiotic therapy, PCT levels should decrease rapidly. (Half-life of 24 to   36 hours.)        Procalcitonin values from samples collected within the first 6 hours of systemic infection   may still be low. Retesting may be indicated. Values from day 1 and day 4 can be entered into the Change in Procalcitonin Calculator   (www.[facility]-pct-calculator. com) to determine the patient's Mortality Risk Prognosis        In healthy neonates, plasma Procalcitonin (PCT) concentrations increase gradually after   birth, reaching peak values at about 24 hours of age then decrease to normal values below   0.5 ng/mL by 48-72 hours of age.          Intake/Output Summary (Last 24 hours) at 1/24/2023 0412  Last data filed at 1/24/2023 0330  Gross per 24 hour   Intake 1125 ml   Output 1750 ml   Net -625 ml     Temp: 94.3    Recent vancomycin administrations vancomycin (VANCOCIN) 750 mg in dextrose 5 % 250 mL IVPB (mg) 750 mg New Bag 01/24/23 0230                  Culture Date / Source  /  Results  1/23      BC X2               urine    MRSA Nasal Swab: N/A. Non-respiratory infection. Rose Marie Alejandre PLAN   Initial loading dose of 25mg/kg (max of 2,500 mg) = 750  mg  x 1, then  vancomycin 1000 mg IV every 12 hours. Ensured BUN/sCr ordered at baseline and every 48 hours x at least 3 levels, then at least weekly. Vancomycin level ordered for 01/25/23 @ 0600. Will use bayesian method for dosing. This level will not be a trough. Target AUC/TRACY: 400-600. Vancomycin Target Concentration Parameters  Treatment  Population Target AUC/TRACY Target Trough   Invasive MRSA Infection (bacteremia, pneumonia, meningitis, endocarditis, osteomyelitis)  Sepsis (undifferentiated) 400-600 N/A   Infection due to non-MRSA pathogen  Empiric treatment of non-invasive MRSA infection  (SSTI, UTI) <500 10-15 mg/L   CrCl < 29 mL/min  Rapidly fluctuating serum creatinine   DONY N/A < 15 mg/L     Renal replacement therapy is dosed by levels, per hospital protocol. Abbreviations  * Pauc: probability that AUC is >400 (efficacy); Pconc: probability that Ctrough is above 20 ?g/mL (toxicity); Tox: Probability of nephrotoxicity, based on Sera et al. Clin Infect Dis 2009. Loading dose: 750 mg at 02:30 01/24/2023. Regimen: 1000 mg IV every 12 hours. Start time: 04:12 on 01/24/2023  Exposure target: AUC24 (range)400-600 mg/L.hr   AUC24,ss: 480 mg/L.hr  Probability of AUC24 > 400: 68 %  Ctrough,ss: 13 mg/L  Probability of Ctrough,ss > 20: 20 %  Probability of nephrotoxicity (Sera RENETTA 2009): 8 %      Thank you for the consult. Pharmacy will continue to follow.

## 2023-01-24 NOTE — CONSULTS
[facility]   CONSULT NOTE:      DATE OF CONSULT 1/24/2023    REASON FOR CONSULTATION: Shock      PCP No primary care provider on file. CHIEF COMPLAINT: Abdominal Pain, Fatigue    HISTORY OF PRESENT ILLNESS: This is a 17-year-old female with a past medical history of Parkinson's disease, seizure disorder, bipolar disorder, hypothyroidism who was brought by the EMS to the emergency department due to weakness after call resident at her assisted nursing facility found her in her room. The patient states that over the last 2 to 3 weeks she has been having persistent vomiting and diarrhea has not been improving, she states that nobody from the facility was checking in on her and her co-resident was the one who noted she was feeling sick and asked facility to call EMS. States that she has a history of chronic colitis, I cannot find in her chart but she does state that she had a colonoscopy within the last few weeks and her GI physician told her that she had polyps. She describes her vomit as yellow and nonbloody and she describes her stool as watery and yellow with mucus, states she did have 1 episode of diarrhea which had clots of blood. She states her abdominal pain is in the lower abdomen and radiates to her back, she describes it as sharp and 10 out of 10 in pain. Currently at bedside her nausea is controlled. In the ER she was found to be hypotensive, she has significantly sepsis fluid bolus and was unresponsive to fluids, central line was placed in the right fem and she was subsequently started on norepinephrine. Her urine tox screen was positive for barbiturates, oxycodone and opiates, this seemed to have been taken before she received any medication in the ER. Based on care everywhere her home medications also do not include any opiates, she has been taking sennakot.     Patient was started on broad-spectrum antibiotics, fluids, pressors, CT chest abdomen pelvis completed in the ER showing signs of colitis of the ascending colon and possible common bile duct dilatation. ALLERGIES:  Allergies   Allergen Reactions    Penicillins Shortness Of Breath     Throat swelling    Ertapenem      invanz    Naproxen      History of bleeding ulcer    Valproic Acid Other (See Comments)     Raised ammonia levels    Lactose Rash     Abdominal pain       HOME MEDICATIONS:  Medications Prior to Admission: levothyroxine (SYNTHROID) 75 MCG tablet, Take 75 mcg by mouth in the morning and at bedtime      PAST MEDICAL HISTORY:  History reviewed. No pertinent past medical history. PAST SURGICAL HISTORY:  History reviewed. No pertinent surgical history. SOCIAL HISTORY:  Social History     Socioeconomic History    Marital status:      Spouse name: Not on file    Number of children: Not on file    Years of education: Not on file    Highest education level: Not on file   Occupational History    Not on file   Tobacco Use    Smoking status: Every Day     Packs/day: 0.25     Types: Cigarettes    Smokeless tobacco: Never   Substance and Sexual Activity    Alcohol use: Not on file    Drug use: Not on file    Sexual activity: Not on file   Other Topics Concern    Not on file   Social History Narrative    Not on file     Social Determinants of Health     Financial Resource Strain: Not on file   Food Insecurity: Not on file   Transportation Needs: Not on file   Physical Activity: Not on file   Stress: Not on file   Social Connections: Not on file   Intimate Partner Violence: Not on file   Housing Stability: Not on file       FAMILY HISTORY:  History reviewed. No pertinent family history. REVIEW OF SYSTEMS:  Abdominal pain, nausea, vomiting, diarrhea  Fatigue  All other systems reviewed and are negative. PHYSICAL EXAM:  Vital Signs Blood pressure 128/62, pulse 64, temperature 97.7 °F (36.5 °C), temperature source Oral, resp.  rate 16, height 4' 11\" (1.499 m), weight 104 lb 8 oz (47.4 kg), SpO2 96 %. Oxygen Amount and Delivery:      Admission Weight Weight: 75 lb (34 kg)    General Appearance     Head  Normocephalic, without obvious abnormality, atraumatic    Eyes  conjunctivae/corneas clear. PERRL, EOM's intact. Fundi benign. ENT  Dry mucosa and tongue  Neck  no adenopathy, no carotid bruit, no JVD, supple, symmetrical, trachea midline and thyroid not enlarged, symmetric, no tenderness/mass/nodules  Lungs  Lungs are clear to auscultation bilaterally  Heart: regular rate and rhythm, S1, S2 normal, no murmur, click, rub or gallop  Abdomen  Soft, generalized tenderness, hypoactive bowel sounds, negative schwarz's sign, no rebound tenderness or rigidity noted   Extremities No edema, rashes or pallor  Skin  Skin color, texture, turgor normal. No rashes or lesions  Neurologic: Alert and oriented X 3, normal strength and tone.          Imaging        Lab Review  CBC     Lab Results   Component Value Date/Time    WBC 5.6 01/24/2023 08:13 AM    RBC 4.01 01/24/2023 08:13 AM    RBC 3.54 09/06/2011 05:00 AM    HGB 12.7 01/24/2023 08:13 AM    HCT 37.7 01/24/2023 08:13 AM     01/24/2023 08:13 AM     09/06/2011 05:00 AM    MCV 94.0 01/24/2023 08:13 AM    MCH 31.7 01/24/2023 08:13 AM    MCHC 33.7 01/24/2023 08:13 AM    RDW 13.1 01/24/2023 08:13 AM    LYMPHOPCT 26 01/24/2023 08:13 AM    MONOPCT 8 01/24/2023 08:13 AM    BASOPCT 1 01/24/2023 08:13 AM    MONOSABS 0.40 01/24/2023 08:13 AM    LYMPHSABS 1.50 01/24/2023 08:13 AM    EOSABS 0.10 01/24/2023 08:13 AM    BASOSABS 0.00 01/24/2023 08:13 AM    DIFFTYPE NOT REPORTED 06/14/2021 07:07 PM       BMP   Lab Results   Component Value Date/Time     01/24/2023 06:42 AM    K 5.5 01/24/2023 06:42 AM     01/24/2023 06:42 AM    CO2 20 01/24/2023 06:42 AM    BUN 11 01/24/2023 06:42 AM    CREATININE 0.41 01/24/2023 06:42 AM    GLUCOSE 82 01/24/2023 06:42 AM    GLUCOSE 121 09/06/2011 05:00 AM    CALCIUM 7.2 01/24/2023 06:42 AM LFTS  Lab Results   Component Value Date/Time    ALKPHOS 55 01/23/2023 09:27 PM    ALT <5 01/23/2023 09:27 PM    AST 15 01/23/2023 09:27 PM    PROT 5.9 01/23/2023 09:27 PM    BILITOT 0.2 01/23/2023 09:27 PM    LABALBU 3.8 01/23/2023 09:27 PM       INR  Recent Labs     01/23/23 2127   PROTIME 13.1   INR 1.0       APTT  No results for input(s): APTT in the last 72 hours. Lactic Acid  Lab Results   Component Value Date/Time    LACTA 1.3 01/24/2023 05:09 AM    LACTA 2.7 06/14/2021 09:31 PM    LACTA 2.5 06/14/2021 07:07 PM        PRO-BNP   Recent Labs     01/23/23 2127   PROBNP 68           ABGs:   Lab Results   Component Value Date/Time    PHART 7.363 06/14/2021 07:13 PM    PO2ART 352.0 06/14/2021 07:13 PM    ZUJ9OEX 39.7 06/14/2021 07:13 PM       Lab Results   Component Value Date/Time    MODE PRVC 06/14/2021 07:13 PM         Impression    Septic Shock secondary to Colitis  Hypovolemia  Acute Diarrhea  Episode of Hematochezia  Hypothyroidism  Parkinson's Disease  Remote history of Seizures (per chart) not on any medication      Plan:      Continue with Norepinephrine keeping MAP >65, wean as tolerated  Aggressive fluids   Can continue with broad spectrum antibiotics Cefepime, Flagyl and Vancomycin for now, her shock is likely secondary to combination of hypovolemia and her colitis would consider de escalating once shock starts to improve  Would get a stool panel as well as a fecal occult  Given some CBD dilatation on CT abdomen, RUQ ultrasound to evaluate GB, LFT's are normal    Electronically signed by Idalia Forde MD on 1/24/2023 at 9:05 AM      Patient seen and examined independently by me. Above discussed and I agree with resident note except where indicated in the EMR revision history. Also see my additional comments and changes indicated by discrete font, text color, italics, and/or initials. Labs, cultures, and radiographs where available were reviewed.        At this point, she appears to have sepsis/SIRS etiology unclear. She was actually in septic shock on arrival and she was on norepinephrine. Systolic blood pressure is still on the lower side, but MAP is above 65. We reviewed her CT of the chest, and there is minimal fibrosis at the bases but she has what appears to be central lobar emphysema versus cystic lesions. She stated that she had pulmonary function test at the [facility] clinic but so far we have not found this to be true. Additionally, she stated that she had colitis diagnosed many years ago but she sees Dr. Natalya Love at the [facility] clinic and there is no mention of colitis. In fact, she has chronic constipation. She had a recent EGD which was unrevealing other than GERD (H. pylori negative) and a colonoscopy just showed a polyp. These were done in December of last year. Continue aggressive IV fluid hydration and if needed can restart pressors. Do not think that she really needs vancomycin. Continue DVT and GI prophylaxis. Continue ICU monitoring today. There is also a note in the [facility] clinic records about Adult Protective Services being called because apparently she was stealing medications at her ECF. However does not mention which ECF she was at.     Electronically signed by Lynne Villagomez MD on 1/24/2023 at 11:05 AM

## 2023-01-24 NOTE — ED PROVIDER NOTES
[address]  Emergency Department Encounter  EmergencyMedicine Resident     Pt Kayla Mendez  MRN: [id number]  [place] 1960  Date of evaluation: 1/23/23  PCP:  No primary care provider on file. This patient was evaluated in the Emergency Department for symptoms described in the history of present illness. CHIEF COMPLAINT       Chief Complaint   Patient presents with    Fatigue    Other     Hypotension       HISTORY OF PRESENT ILLNESS  (Location/Symptom, Timing/Onset, Context/Setting, Quality, Duration, Modifying Factors, Severity.)      Kiana Coelho is a 63 y.o. female with Parkinson's disease who presents via EMS for fatigue and hypotension. Patient was at her assisted nursing facility when EMS was called due to her friend there being concern that patient was more weak than normal.  Patient apparently has been lying on the couch for a long period of time. Upon initial evaluation EMS found patient to be hypotensive to the 60s over 30s. Patient was given 2 L normal saline per EMS and brought to the emergency department. Upon initial evaluation patient endorsing chest pain, abdominal pain. States that due to her parkinsonism she hurts all over. Denies fever/chills or recent illnesses. Denies recent falls. No headache. No blood thinners. PAST MEDICAL / SURGICAL / SOCIAL / FAMILY HISTORY      has no past medical history on file. Parkinson's disease     has no past surgical history on file.       Social History     Socioeconomic History    Marital status:      Spouse name: Not on file    Number of children: Not on file    Years of education: Not on file    Highest education level: Not on file   Occupational History    Not on file   Tobacco Use    Smoking status: Every Day     Packs/day: 0.25     Types: Cigarettes    Smokeless tobacco: Never   Substance and Sexual Activity    Alcohol use: Not on file    Drug use: Not on file    Sexual activity: Not on file   Other Topics Concern    Not on file   Social History Narrative    Not on file     Social Determinants of Health     Financial Resource Strain: Not on file   Food Insecurity: Not on file   Transportation Needs: Not on file   Physical Activity: Not on file   Stress: Not on file   Social Connections: Not on file   Intimate Partner Violence: Not on file   Housing Stability: Not on file       History reviewed. No pertinent family history. Allergies:    Penicillins, Ertapenem, Naproxen, Valproic acid, and Lactose    Home Medications:  Prior to Admission medications    Medication Sig Start Date End Date Taking? Authorizing Provider   levothyroxine (SYNTHROID) 75 MCG tablet Take 75 mcg by mouth in the morning and at bedtime 4/2/22   Historical Provider, MD       REVIEW OF SYSTEMS    (2-9 systems for level 4, 10 or more for level 5)      Review of Systems   Unable to perform ROS: Acuity of condition       PHYSICAL EXAM   (up to 7 for level 4, 8 or more for level 5)    INITIAL VITALS:   BP 87/61   Pulse 65   Temp 98.3 °F (36.8 °C)   Resp 18   Ht 4' 11\" (1.499 m)   Wt 75 lb (34 kg)   SpO2 94%   BMI 15.15 kg/m²   I have reviewed the triage vital signs. Const: Well nourished, well developed, appears stated age, no acute distress, nontoxic  Eyes: PERRL, no conjunctival injection  HENT: NCAT, Neck supple without meningismus   CV: RRR, Warm, well-perfused extremities  RESP: CTAB, Unlabored respiratory effort  GI: soft, diffusely tender to palpation, non-distended, no masses  MSK: No gross deformities appreciated  Skin: Warm, dry. No rashes  Neuro: Alert and oriented x4, GCS 15, CNs II-XII grossly intact. Sensation and motor function of extremities grossly intact. Diffuse resting tremors. Psych: Appropriate mood and affect.       DIFFERENTIAL  DIAGNOSIS   DDX:  Parkinson's, infection, ACS/MI, pneumonia, pneumothorax, intoxication, Parkinson's disease, diverticulitis, gastroenteritis, pancreatitis, hepatitis, cholecystitis, appendicitis, obstruction    Initial MDM:  53-year-old female presents emergency department with hypotension and fatigue. Patient has a history of Parkinson's disease. EMS found patient to be 60s over 30s for blood pressure. Given 2 L normal saline per EMS. Upon initial arrival patient hypotensive to 79/63. Another liter normal saline given. Patient appears to be dry. Patient is alert and oriented x4, GCS 15. Will conduct sepsis work-up. Will perform altered mental status work-up. Will treat symptoms and reevaluate.     PLAN (LABS / IMAGING / EKG):  Orders Placed This Encounter   Procedures    Culture, Blood 1    Culture, Blood 1    Culture, Urine    XR CHEST PORTABLE    CT CHEST ABDOMEN PELVIS W CONTRAST Additional Contrast? None    CBC with Auto Differential    Comprehensive Metabolic Panel    Lactate, Sepsis    Protime-INR    Procalcitonin    Troponin    Urinalysis with Microscopic    Lipase    Brain Natriuretic Peptide    TOX SCR, BLD, ED    URINE DRUG SCREEN    Acetaminophen Level    Ethanol    Salicylate    Drug screen, tricyclic    HYPOGLYCEMIA TREATMENT: blood glucose LESS THAN 70 mg/dL and patient ALERT and TOLERATING PO    HYPOGLYCEMIA TREATMENT: blood glucose LESS THAN 70 mg/dL and patient NOT ALERT or NPO    Inpatient consult to Internal Medicine    POCT Glucose    EKG 12 Lead       MEDICATIONS ORDERED:  Orders Placed This Encounter   Medications    0.9 % sodium chloride bolus    0.9 % sodium chloride bolus    sodium chloride flush 0.9 % injection 10 mL    iopamidol (ISOVUE-370) 76 % injection 75 mL    glucose chewable tablet 16 g    OR Linked Order Group     dextrose bolus 10% 125 mL     dextrose bolus 10% 250 mL    glucagon (rDNA) injection 1 mg    dextrose 10 % infusion         DIAGNOSTIC RESULTS / EMERGENCY DEPARTMENT COURSE / MDM   LAB RESULTS:  Results for orders placed or performed during the hospital encounter of 01/23/23   CBC with Auto Differential   Result Value Ref Range    WBC 4.5 3.5 - 11.0 k/uL    RBC 3.99 (L) 4.0 - 5.2 m/uL    Hemoglobin 12.7 12.0 - 16.0 g/dL    Hematocrit 37.7 36 - 46 %    MCV 94.6 80 - 100 fL    MCH 31.8 26 - 34 pg    MCHC 33.6 31 - 37 g/dL    RDW 13.0 11.5 - 14.9 %    Platelets 878 777 - 249 k/uL    MPV 8.1 6.0 - 12.0 fL    Seg Neutrophils 48 36 - 66 %    Lymphocytes 40 24 - 44 %    Monocytes 9 (H) 1 - 7 %    Eosinophils % 2 0 - 4 %    Basophils 1 0 - 2 %    Segs Absolute 2.10 1.3 - 9.1 k/uL    Absolute Lymph # 1.80 1.0 - 4.8 k/uL    Absolute Mono # 0.40 0.1 - 1.3 k/uL    Absolute Eos # 0.10 0.0 - 0.4 k/uL    Basophils Absolute 0.00 0.0 - 0.2 k/uL   Comprehensive Metabolic Panel   Result Value Ref Range    Glucose 62 (L) 70 - 99 mg/dL    BUN 15 8 - 23 mg/dL    Creatinine 0.41 (L) 0.50 - 0.90 mg/dL    Est, Glom Filt Rate >60 >60 mL/min/1.73m2    Calcium 8.1 (L) 8.6 - 10.4 mg/dL    Sodium 140 135 - 144 mmol/L    Potassium 3.3 (L) 3.7 - 5.3 mmol/L    Chloride 103 98 - 107 mmol/L    CO2 18 (L) 20 - 31 mmol/L    Anion Gap 19 (H) 9 - 17 mmol/L    Alkaline Phosphatase 55 35 - 104 U/L    ALT <5 (L) 5 - 33 U/L    AST 15 <32 U/L    Total Bilirubin 0.2 (L) 0.3 - 1.2 mg/dL    Total Protein 5.9 (L) 6.4 - 8.3 g/dL    Albumin 3.8 3.5 - 5.2 g/dL   Lactate, Sepsis   Result Value Ref Range    Lactic Acid, Sepsis 4.3 (H) 0.5 - 1.9 mmol/L   Protime-INR   Result Value Ref Range    Protime 13.1 11.8 - 14.6 sec    INR 1.0    Procalcitonin   Result Value Ref Range    Procalcitonin 0.03 <0.09 ng/mL   Troponin   Result Value Ref Range    Troponin, High Sensitivity 7 0 - 14 ng/L   Troponin   Result Value Ref Range    Troponin, High Sensitivity 8 0 - 14 ng/L   Lipase   Result Value Ref Range    Lipase 29 13 - 60 U/L   Brain Natriuretic Peptide   Result Value Ref Range    Pro-BNP 68 <300 pg/mL   TOX SCR, BLD, ED   Result Value Ref Range    Acetaminophen Level PENDING ug/mL    Ethanol PENDING mg/dL    Ethanol percent PENDING %    Salicylate Lvl PENDING mg/dL    Toxic Tricyclic Sc,Blood WRONG TEST ORDERED    Acetaminophen Level   Result Value Ref Range    Acetaminophen Level 11 10 - 30 ug/mL   Ethanol   Result Value Ref Range    Ethanol 17 (H) <10 mg/dL    Ethanol percent 9.408 %   Salicylate   Result Value Ref Range    Salicylate Lvl <1 (L) 3 - 10 mg/dL       Initial lactic acid of 4.3, will fluid resuscitate. Slightly elevated ethanol at 17, will not contribute to altered mental status. RADIOLOGY:  XR CHEST PORTABLE    Result Date: 1/23/2023  EXAMINATION: ONE XRAY VIEW OF THE CHEST 1/23/2023 9:29 pm COMPARISON: None. HISTORY: ORDERING SYSTEM PROVIDED HISTORY: chest pain TECHNOLOGIST PROVIDED HISTORY: chest pain Reason for Exam: Chest pain, took best image per pt condition FINDINGS: The cardiomediastinal silhouette is normal in size. There is prominence/fullness of the left hilar region, nonspecific. No focal airspace disease. No pleural effusion or pneumothorax. No evidence of acute osseous abnormality. 1.  Nonspecific fullness/prominence of the left hilum likely secondary to vessels and lymph nodes, although an underlying lesion is not excluded. Consider CT chest with contrast for further evaluation. 2.  No focal airspace disease. CT CHEST ABDOMEN PELVIS W CONTRAST Additional Contrast? None    Result Date: 1/23/2023  EXAMINATION: CT OF THE CHEST, ABDOMEN, AND PELVIS WITH CONTRAST 1/23/2023 10:01 pm TECHNIQUE: CT of the chest, abdomen and pelvis was performed with the administration of intravenous contrast. Multiplanar reformatted images are provided for review. Automated exposure control, iterative reconstruction, and/or weight based adjustment of the mA/kV was utilized to reduce the radiation dose to as low as reasonably achievable.  COMPARISON: None HISTORY: ORDERING SYSTEM PROVIDED HISTORY: abdominal pain TECHNOLOGIST PROVIDED HISTORY: abdominal pain Reason for Exam: abdominal pain, abnormal chest xray Additional signs and symptoms: hip surgeries FINDINGS: Chest: Chest Wall and Thoracic Inlet: No axillary or supraclavicular lymphadenopathy.  The thyroid gland is unremarkable. Mediastinum and Luisa: No mediastinal or hilar lymphadenopathy. Normal caliber of the thoracic aorta.  Borderline dilation of the main pulmonary artery measuring up to 3.2 cm.  No central pulmonary embolism.  Borderline cardiomegaly.  No pericardial effusion. Lungs/Pleura: Mild centrilobular emphysema.  Dependent hypoventilatory changes of the lungs.  Bilateral lower lobe bronchiectasis.  No evidence of left hilar mass.  No pleural effusion. Bones: No suspicious osseous findings.  Remote right lateral 5th-7th rib fractures.  Subacute to remote right lateral 9th rib fracture. Abdomen/Pelvis: Organs: The liver is unremarkable.  Mild intrahepatic biliary ductal prominence.  The common bile duct is within normal limits in caliber.  The gallbladder is unremarkable.  The spleen, pancreas, and adrenal glands are unremarkable.  Left upper pole renal cyst demonstrates hairline thin internal septations.  No hydronephrosis or renal calculi. GI/Bowel: No evidence of bowel obstruction.  There is mucosal hyperenhancement and wall thickening of the ascending colon compatible with colitis.  Mild surrounding inflammatory stranding. Pelvis: Distended bladder.  Retroverted uterus with an associated exophytic anterior uterine fibroid. Peritoneum/Retroperitoneum: No retroperitoneal, mesenteric, or pelvic lymphadenopathy.  No free fluid or free intraperitoneal gas.  The abdominal aorta demonstrates scattered atherosclerosis without aneurysm.  Multiple lucent centered venous phleboliths are seen along the right pelvic sidewall. Bones/Soft Tissues: Age-indeterminate compression deformities of the L2 and L3 vertebral bodies.  Moderate to severe degenerative disc disease at the L4-L5 level.     1. Mucosal hyperenhancement, moderate wall thickening, and surrounding inflammatory stranding of the ascending colon compatible with colitis. 2.  Minimal  intrahepatic biliary ductal prominence without common bile duct dilation. Consider correlation with  LFTs if clinically indicated. 3.  No evidence of left hilar mass. Hilar prominence noted on same day chest radiograph corresponds to prominent hilar vessels. 4.  Age-indeterminate compression deformities of the L2 and L3 vertebral bodies. Correlate with clinical symptoms. EKG  Rhythm: normal sinus   Rate: normal  Axis: normal  Ectopy: none  Conduction: normal  ST Segments: no acute change  T Waves: Inversion in anterior leads  Q Waves: none    EKG  Impression: non-specific EKG     All EKG's are interpreted by the Emergency Department Physician who either signs or Co-signs this chart in the absence of a cardiologist.      PROCEDURES:  PROCEDURE NOTE - CENTRAL VENOUS LINE PLACEMENT    PATIENT NAME: Dee S Adena [facility] [address] [id number]  DATE: 1/24/2023  ATTENDING PHYSICIAN: Dr. Juli Grayson DIAGNOSIS:  vascular access, long term access, and centrally administered medications  POSTOPERATIVE DIAGNOSIS:  Same  PROCEDURE PERFORMED:  Right Femoral Vein Central Line Insertion  PERFORMING PHYSICIAN: Kathrin Paz DO  ANESTHESIA:  Local utilizing 1% lidocaine  ESTIMATED BLOOD LOSS:  Less than 25 ml  COMPLICATIONS:  None immediately appreciated. DISCUSSION:  Michelle Chase is a 63y.o.-year-old female who requires central IV access vascular access, long term access, and centrally administered medications. The history and physical examination were reviewed and confirmed. CONSENT: The patient provided verbal consent for this procedure. PROCEDURE:  A timeout was initiated by the bedside nurse and was confirmed by those present. The patient was placed in a supine position. The skin overlying the Right Femoral Vein was prepped with chlorhexadine and draped in sterile fashion. The skin was infiltrated with local anesthetic. The vessel and surrounding anatomy was visualized using ultrasound. Through the anesthetized region, the introducer needle was inserted into the femoral vein returning dark red non pulsatile blood. A guidewire was placed through the center of the needle with no resistance. Ultrasound confirmed presence of wire in the vein. A small incision made in the skin with a #11 scalpel blade. The dilator was inserted into the skin and vein over guidewire using Seldinger technique. The dilator was then removed and the 7F 20cm catheter was placed in the vein over the guidewire using Seldinger technique. The guidewire was then removed and all ports aspirated and flushed appropriately. The catheter then secured using silk suture and a temporary sterile dressing was applied. No immediate complication was evident. All sponge, instrument and needle counts were correct at the completion of the procedure. The patient tolerated the procedure well with no immediate complication evident. CONSULTS:  IP CONSULT TO INTERNAL MEDICINE      [facility]/EMERGENCY DEPARTMENT COURSE:  ED Course as of 01/24/23 0043 Mon Jan 23, 2023   [id number] Discussed with Dr. Pro Sanders intensivist who accepts admission to ICU and agrees with current workup and management [CONG]      ED Course User Index  [CONG] Juan Gil MD     Throughout patient's emergency department stay her hypotension minimally improved with fluids. Central line placed for anticipation for vasopressor use. Internal medicine and ICU consulted. Internal medicine ICU to admit patient to the ICU. Patient understands and agrees with plan. CRITICAL CARE:  Please see Attending Note    FINAL IMPRESSION      1. Hypotension, unspecified hypotension type    2. Hypoglycemia    3. Colitis          DISPOSITION / PLAN     DISPOSITION Decision To Admit 01/23/2023 11:06:17 PM    PATIENT REFERRED TO:  No follow-up provider specified.     DISCHARGE MEDICATIONS:  New Prescriptions    No medications on file       Batool Smith DO  Emergency Medicine Resident, PGY 2    (Please note that portions of this note were completed with a voice recognition program.  Efforts were made to edit the dictations but occasionally words are mis-transcribed.)       Constantino Drummond DO  Resident  01/24/23 2155

## 2023-01-24 NOTE — ED TRIAGE NOTES
Mode of arrival (squad #, walk in, police, etc) : EMS        Chief complaint(s): Hypotension, weakness        Arrival Note (brief scenario, treatment PTA, etc). : Pt came in for hypotension. Pt received 1L NS bolus en en route by EMS squad. C= \"Have you ever felt that you should Cut down on your drinking? \"  No  A= \"Have people Annoyed you by criticizing your drinking? \"  No  G= \"Have you ever felt bad or Guilty about your drinking? \"  No  E= \"Have you ever had a drink as an Eye-opener first thing in the morning to steady your nerves or to help a hangover? \"  No      Deferred []      Reason for deferring: N/A    *If yes to two or more: probable alcohol abuse. *

## 2023-01-24 NOTE — CONSULTS
General Surgery Consult      Pt Name: Ofe Chilel  MRN: [id number]  YOB: 1960  Date of evaluation: 2023  Primary Care Physician: No primary care provider on file. Patient evaluated at the request of  Dr. Sienna Bess  Reason for evaluation: Right-sided abdominal pain    SUBJECTIVE:   History of Chief Complaint:    Ofe Chilel is a 63 y.o. female who presents with right-sided abdominal pain. This has been going on for few days. Some nausea. No emesis. Rectal bleeding. Patient states she has history of ulcerative colitis. She follows with GI physician at the [facility]. Last colonoscopy in . Patient feels better. Case discussed with the nurse taking care of the patient. Drug testing noted. Patient with history of  and laparoscopy. Patient was on Levophed she is off pressors at this point. Urine output is adequate. Wants to eat or drink. ER work-up reviewed. Symptom onset has been acute for a time period of few day(s). Severity is described as moderate. Course of her symptoms over time is acute. Past Medical History   has no past medical history on file. Past Surgical History   has no past surgical history on file. Medications  Prior to Admission medications    Medication Sig Start Date End Date Taking?  Authorizing Provider   benztropine (COGENTIN) 2 MG tablet Take 2 mg by mouth 2 times daily   Yes Historical Provider, MD   calcium carbonate 600 MG TABS tablet Take 1 tablet by mouth daily   Yes Historical Provider, MD   vitamin D (CHOLECALCIFEROL) 25 MCG (1000 UT) TABS tablet Take 2,000 Units by mouth daily   Yes Historical Provider, MD   diclofenac sodium (VOLTAREN) 1 % GEL Apply topically 2 times daily   Yes Historical Provider, MD   magnesium oxide (MAG-OX) 400 MG tablet Take 400 mg by mouth daily   Yes Historical Provider, MD   methocarbamol (ROBAXIN) 750 MG tablet Take 750 mg by mouth daily as needed   Yes Historical Provider, MD   omeprazole (PRILOSEC) 20 MG delayed release capsule Take 20 mg by mouth daily   Yes Historical Provider, MD   ondansetron (ZOFRAN) 4 MG tablet Take 4 mg by mouth every 8 hours as needed for Nausea or Vomiting   Yes Historical Provider, MD   potassium chloride (KLOR-CON M) 20 MEQ extended release tablet Take 20 mEq by mouth daily   Yes Historical Provider, MD   senna-docusate (PERICOLACE) 8.6-50 MG per tablet Take 2 tablets by mouth daily   Yes Historical Provider, MD   clonazePAM (KLONOPIN) 1 MG tablet Take 1 mg by mouth 2 times daily. Yes Historical Provider, MD   linaclotide (LINZESS) 290 MCG CAPS capsule Take 290 mcg by mouth every morning (before breakfast)   Yes Historical Provider, MD   rOPINIRole (REQUIP) 0.5 MG tablet Take 0.5 mg by mouth 3 times daily   Yes Historical Provider, MD   levothyroxine (SYNTHROID) 75 MCG tablet Take 37.5 mcg by mouth Daily 4/2/22   Historical Provider, MD     Allergies  is allergic to penicillins, ertapenem, naproxen, and valproic acid. Family History  family history is not on file. Social History   reports that she has been smoking. She has been smoking an average of .25 packs per day. She has never used smokeless tobacco.    Review of Systems:  All 10 system review was conducted. Please refer to chart. OBJECTIVE:   VITALS:  height is 4' 11\" (1.499 m) and weight is 104 lb 8 oz (47.4 kg). Her axillary temperature is 97.9 °F (36.6 °C). Her blood pressure is 104/52 (abnormal) and her pulse is 72. Her respiration is 16 and oxygen saturation is 94%. CONSTITUTIONAL: Alert and oriented times 3, no acute distress and cooperative to examination with proper mood and affect. SKIN: Skin color, texture, turgor normal. No rashes or lesions. LYMPH: no cervical nodes, no inguinal nodes  HEENT: Head is normocephalic, atraumatic.  EOMI, PERRLA  NECK: Supple, symmetrical, trachea midline, no adenopathy, thyroid symmetric, not enlarged and no tenderness, skin normal  CHEST/LUNGS: chest symmetric with normal A/P diameter, normal respiratory rate and rhythm, lungs clear to auscultation without wheezes, rales or rhonchi. No accessory muscle use. Scars None   CARDIOVASCULAR: Heart regular rate and rhythm Normal S1 and S2. . Carotid and femoral pulses 2+/4 and equal bilaterally  ABDOMEN: Soft abdomen nonspecific tenderness nondistended. Nothing acute no peritoneal signs. Surgical scars are well-healed. RECTAL: deferred  NEUROLOGIC: There are no focalizing motor or sensory deficits. CN II-XII are grossly intact.   EXTREMITIES: no cyanosis, no clubbing, and no edema    LABS:   CBC with Differential:    Lab Results   Component Value Date/Time    WBC 5.6 01/24/2023 08:13 AM    RBC 4.01 01/24/2023 08:13 AM    RBC 3.54 09/06/2011 05:00 AM    HGB 12.7 01/24/2023 08:13 AM    HCT 37.7 01/24/2023 08:13 AM     01/24/2023 08:13 AM     09/06/2011 05:00 AM    MCV 94.0 01/24/2023 08:13 AM    MCH 31.7 01/24/2023 08:13 AM    MCHC 33.7 01/24/2023 08:13 AM    RDW 13.1 01/24/2023 08:13 AM    LYMPHOPCT 26 01/24/2023 08:13 AM    MONOPCT 8 01/24/2023 08:13 AM    BASOPCT 1 01/24/2023 08:13 AM    MONOSABS 0.40 01/24/2023 08:13 AM    LYMPHSABS 1.50 01/24/2023 08:13 AM    EOSABS 0.10 01/24/2023 08:13 AM    BASOSABS 0.00 01/24/2023 08:13 AM    DIFFTYPE NOT REPORTED 06/14/2021 07:07 PM     BMP:    Lab Results   Component Value Date/Time     01/24/2023 08:13 AM    K 4.9 01/24/2023 08:13 AM     01/24/2023 08:13 AM    CO2 21 01/24/2023 08:13 AM    BUN 11 01/24/2023 08:13 AM    LABALBU 3.8 01/23/2023 09:27 PM    CREATININE 0.46 01/24/2023 08:13 AM    CALCIUM 7.4 01/24/2023 08:13 AM    GFRAA CANNOT BE CALCULATED 06/14/2021 07:07 PM    LABGLOM >60 01/24/2023 08:13 AM    GLUCOSE 87 01/24/2023 08:13 AM    GLUCOSE 121 09/06/2011 05:00 AM     Hepatic Function Panel:    Lab Results   Component Value Date/Time    ALKPHOS 55 01/23/2023 09:27 PM    ALT <5 01/23/2023 09:27 PM    AST 15 01/23/2023 09:27 PM    PROT 5.9 01/23/2023 09:27 PM BILITOT 0.2 01/23/2023 09:27 PM    LABALBU 3.8 01/23/2023 09:27 PM     Calcium:    Lab Results   Component Value Date/Time    CALCIUM 7.4 01/24/2023 08:13 AM     Magnesium:    Lab Results   Component Value Date/Time    MG 2.4 06/14/2021 07:07 PM     Phosphorus:  No results found for: PHOS  PT/INR:    Lab Results   Component Value Date/Time    PROTIME 13.1 01/23/2023 09:27 PM    INR 1.0 01/23/2023 09:27 PM     ABG:    Lab Results   Component Value Date/Time    PHART 7.363 06/14/2021 07:13 PM    OEQ3XMR 39.7 06/14/2021 07:13 PM    PO2ART 352.0 06/14/2021 07:13 PM    SMG3VDX 22.6 06/14/2021 07:13 PM    O6LBNLTM 94.2 06/14/2021 07:13 PM     Urine Culture:  No components found for: CURINE  Blood Culture:  No components found for: CBLOOD, CFUNGUSBL  Stool Culture:  No components found for: CSTOOL    RADIOLOGY:   I have personally reviewed the following films:  US GALLBLADDER RUQ    Result Date: 1/24/2023  EXAMINATION: RIGHT UPPER QUADRANT ULTRASOUND 1/24/2023 1:25 pm COMPARISON: None. HISTORY: ORDERING SYSTEM PROVIDED HISTORY: Rule out gallbladder pathology TECHNOLOGIST PROVIDED HISTORY: Rule out gallbladder pathology FINDINGS: LIVER:  The liver demonstrates normal echogenicity without evidence of intrahepatic biliary ductal dilatation. Hepatopetal flow portal vein. Liver 13.9 cm in length. BILIARY SYSTEM:  Gallbladder is unremarkable without evidence of pericholecystic fluid, wall thickening or stones. Negative sonographic Walsh's sign. Common bile duct is within normal limits measuring 3.8 mm. RIGHT KIDNEY: The right kidney is grossly unremarkable without evidence of hydronephrosis. PANCREAS:  Visualized portions of the pancreas are unremarkable. OTHER: No evidence of right upper quadrant ascites. Unremarkable right upper quadrant ultrasound. XR CHEST PORTABLE    Result Date: 1/23/2023  EXAMINATION: ONE XRAY VIEW OF THE CHEST 1/23/2023 9:29 pm COMPARISON: None.  HISTORY: ORDERING SYSTEM PROVIDED HISTORY: chest pain TECHNOLOGIST PROVIDED HISTORY: chest pain Reason for Exam: Chest pain, took best image per pt condition FINDINGS: The cardiomediastinal silhouette is normal in size. There is prominence/fullness of the left hilar region, nonspecific. No focal airspace disease. No pleural effusion or pneumothorax. No evidence of acute osseous abnormality. 1.  Nonspecific fullness/prominence of the left hilum likely secondary to vessels and lymph nodes, although an underlying lesion is not excluded. Consider CT chest with contrast for further evaluation. 2.  No focal airspace disease. CT CHEST ABDOMEN PELVIS W CONTRAST Additional Contrast? None    Result Date: 1/23/2023  EXAMINATION: CT OF THE CHEST, ABDOMEN, AND PELVIS WITH CONTRAST 1/23/2023 10:01 pm TECHNIQUE: CT of the chest, abdomen and pelvis was performed with the administration of intravenous contrast. Multiplanar reformatted images are provided for review. Automated exposure control, iterative reconstruction, and/or weight based adjustment of the mA/kV was utilized to reduce the radiation dose to as low as reasonably achievable. COMPARISON: None HISTORY: ORDERING SYSTEM PROVIDED HISTORY: abdominal pain TECHNOLOGIST PROVIDED HISTORY: abdominal pain Reason for Exam: abdominal pain, abnormal chest xray Additional signs and symptoms: hip surgeries FINDINGS: Chest: Chest Wall and Thoracic Inlet: No axillary or supraclavicular lymphadenopathy. The thyroid gland is unremarkable. Mediastinum and Luisa: No mediastinal or hilar lymphadenopathy. Normal caliber of the thoracic aorta. Borderline dilation of the main pulmonary artery measuring up to 3.2 cm. No central pulmonary embolism. Borderline cardiomegaly. No pericardial effusion. Lungs/Pleura: Mild centrilobular emphysema. Dependent hypoventilatory changes of the lungs. Bilateral lower lobe bronchiectasis. No evidence of left hilar mass. No pleural effusion. Bones: No suspicious osseous findings. Remote right lateral 5th-7th rib fractures. Subacute to remote right lateral 9th rib fracture. Abdomen/Pelvis: Organs: The liver is unremarkable. Mild intrahepatic biliary ductal prominence. The common bile duct is within normal limits in caliber. The gallbladder is unremarkable. The spleen, pancreas, and adrenal glands are unremarkable. Left upper pole renal cyst demonstrates hairline thin internal septations. No hydronephrosis or renal calculi. GI/Bowel: No evidence of bowel obstruction. There is mucosal hyperenhancement and wall thickening of the ascending colon compatible with colitis. Mild surrounding inflammatory stranding. Pelvis: Distended bladder. Retroverted uterus with an associated exophytic anterior uterine fibroid. Peritoneum/Retroperitoneum: No retroperitoneal, mesenteric, or pelvic lymphadenopathy. No free fluid or free intraperitoneal gas. The abdominal aorta demonstrates scattered atherosclerosis without aneurysm. Multiple lucent centered venous phleboliths are seen along the right pelvic sidewall. Bones/Soft Tissues: Age-indeterminate compression deformities of the L2 and L3 vertebral bodies. Moderate to severe degenerative disc disease at the L4-L5 level. 1. Mucosal hyperenhancement, moderate wall thickening, and surrounding inflammatory stranding of the ascending colon compatible with colitis. 2.  Minimal intrahepatic biliary ductal prominence without common bile duct dilation. Consider correlation with  LFTs if clinically indicated. 3.  No evidence of left hilar mass. Hilar prominence noted on same day chest radiograph corresponds to prominent hilar vessels. 4.  Age-indeterminate compression deformities of the L2 and L3 vertebral bodies. Correlate with clinical symptoms. IMPRESSION:   History of ulcerative colitis right-sided abdominal pain evidence of colitis on the CT scan. Previous  and tubal ligation.   Ultrasound of the right upper quadrant was unremarkable. CT scan revealed findings compatible with colitis in the ascending colon. Minimal intrahepatic biliary ductal dilatation without common bile duct dilatation. Blood work reveals normal creatinine sodium potassium. Liver function test including alkaline phosphatase ALT AST and bilirubin normal.  Lipase normal.  Urine drug screen positive for barbiturates oxycodone and opiates. WBC count normal.  EtOH level noted. CBC completely normal.    does not have any pertinent problems on file. PLAN:   Start clear liquids. GI consult given history of ulcerative colitis. Patient is hemodynamically stable. No acute surgical intervention. I will follow with you. Discussed with patient and the nursing staff. Thank you for this interesting consult and for allowing us to participate in the care of this patient. If you have any questions please don't hesitate to call.           Electronically signed by Onesimo Narayanan MD  on 1/24/2023 at 6:19 PM

## 2023-01-24 NOTE — H&P
[address] [facility]     HISTORY AND PHYSICAL EXAMINATION            Date:   1/24/2023  Patient name:  Jacqueline Wilson  Date of admission:  1/23/2023  9:13 PM  MRN:   [id number]  Account:  [de-identified]  YOB: 1960  PCP:    No primary care provider on file. Room:   2002/2002-01  Code Status:    Full Code    Chief Complaint:     Chief Complaint   Patient presents with    Fatigue    Other     Hypotension       History Obtained From:     patient    History of Present Illness: The patient is a 63 y.o. Non- / non  female who presents withFatigue and Other (Hypotension)   and she is admitted to the hospital for the management of septic shock and colitis. Past medical history includes bipolar type I disorder, Parkinson's disease, chronic colitis, seizure disorder, Paget's disease, hypothyroidism. Patient states that she has been having abdominal pain, diarrhea, nausea and vomiting for the last 3 to 4 weeks. This vomit has been yellow and nonbloody. The diarrhea she reports having 1 recent bowel movement with blood in it. Yesterday her roommate noticed that she looked very sick and asked the facility to call EMS. On arrival, EMS found her to have a blood pressure in the 60s over 30s.  2 L of IV fluid given in route to the hospital.  Eventually required a right femoral central line due to septic shock requiring Levophed. CT chest abdomen pelvis showed colitis of the ascending colon, unremarkable chest.  Lactic acid of 4.3, most recent of 2.4 this morning. UA showed many bacteria. Urine culture in progress. Blood cultures show no growth thus far. Urine tox screen in the ED was positive for barbiturates and opiates. Patient started on IV Vanco cefepime and Flagyl. Remains on Levophed. She is afebrile currently.   Although reports having a fever over last few days. Past Medical History:     History reviewed. No pertinent past medical history. Past SurgicalHistory:     History reviewed. No pertinent surgical history. Medications Prior to Admission:        Prior to Admission medications    Medication Sig Start Date End Date Taking? Authorizing Provider   levothyroxine (SYNTHROID) 75 MCG tablet Take 75 mcg by mouth in the morning and at bedtime 4/2/22   Historical Provider, MD        Allergies:     Penicillins, Ertapenem, Naproxen, Valproic acid, and Lactose    Social History:     Tobacco:    reports that she has been smoking. She has been smoking an average of .25 packs per day. She has never used smokeless tobacco.  Alcohol:      has no history on file for alcohol use. Drug Use:  has no history on file for drug use. Family History:     History reviewed. No pertinent family history. Review of Systems:     Positive and Negative as described in HPI. Review of Systems   Constitutional: Negative. Negative for activity change, appetite change, chills, fatigue and fever. HENT: Negative. Negative for congestion, sinus pressure, sinus pain and sore throat. Eyes: Negative. Negative for pain, discharge, redness and itching. Respiratory: Negative. Negative for cough, chest tightness, shortness of breath and wheezing. Cardiovascular: Negative. Negative for chest pain, palpitations and leg swelling. Gastrointestinal:  Positive for abdominal pain (Lower abdominal pain extending to the lower back), blood in stool and diarrhea. Negative for abdominal distention, anal bleeding, constipation, nausea, rectal pain and vomiting. Endocrine: Negative. Negative for cold intolerance, heat intolerance, polydipsia, polyphagia and polyuria. Genitourinary: Negative. Negative for dysuria, frequency and urgency. Musculoskeletal:  Positive for back pain.  Negative for arthralgias, gait problem, joint swelling, myalgias, neck pain and neck stiffness. Skin: Negative. Negative for color change, pallor, rash and wound. Neurological: Negative. Negative for dizziness, seizures, weakness, light-headedness, numbness and headaches. Psychiatric/Behavioral: Negative. Negative for confusion and hallucinations. The patient is not nervous/anxious and is not hyperactive. Physical Exam:   BP (!) 79/59   Pulse 74   Temp 97.7 °F (36.5 °C) (Oral)   Resp 10   Ht 4' 11\" (1.499 m)   Wt 104 lb 8 oz (47.4 kg)   SpO2 99%   BMI 21.11 kg/m²   Temp (24hrs), Av °F (35.6 °C), Min:93.3 °F (34.1 °C), Max:98.3 °F (36.8 °C)    Recent Labs     23  0052 23  0130 23  0318   POCGLU 74 274* 139*       Intake/Output Summary (Last 24 hours) at 2023 0937  Last data filed at 2023 0800  Gross per 24 hour   Intake 1125 ml   Output 2750 ml   Net -1625 ml       Physical Exam  Constitutional:       Appearance: Normal appearance. HENT:      Head: Normocephalic and atraumatic. Mouth/Throat:      Mouth: Mucous membranes are dry. Eyes:      Extraocular Movements: Extraocular movements intact. Conjunctiva/sclera: Conjunctivae normal.      Pupils: Pupils are equal, round, and reactive to light. Cardiovascular:      Rate and Rhythm: Normal rate and regular rhythm. Pulses: Normal pulses. Heart sounds: Normal heart sounds. Pulmonary:      Effort: Pulmonary effort is normal.      Breath sounds: Normal breath sounds. Abdominal:      General: Abdomen is flat. Bowel sounds are normal.      Palpations: Abdomen is soft. Comments: Tenderness on palpation throughout the entirety abdomen. More severe in the lower third of the abdomen. Walsh sign negative   Musculoskeletal:         General: No swelling or tenderness. Normal range of motion. Cervical back: Normal range of motion and neck supple. Skin:     General: Skin is warm and dry. Neurological:      General: No focal deficit present.       Mental Status: She is alert and oriented to person, place, and time. Mental status is at baseline.        Investigations:     Laboratory Testing:  Recent Results (from the past 24 hour(s))   EKG 12 Lead    Collection Time: 01/23/23  9:13 PM   Result Value Ref Range    Ventricular Rate 60 BPM    Atrial Rate 60 BPM    P-R Interval 174 ms    QRS Duration 82 ms    Q-T Interval 450 ms    QTc Calculation (Bazett) 450 ms    P Axis 44 degrees    R Axis 51 degrees    T Axis 58 degrees   CBC with Auto Differential    Collection Time: 01/23/23  9:27 PM   Result Value Ref Range    WBC 4.5 3.5 - 11.0 k/uL    RBC 3.99 (L) 4.0 - 5.2 m/uL    Hemoglobin 12.7 12.0 - 16.0 g/dL    Hematocrit 37.7 36 - 46 %    MCV 94.6 80 - 100 fL    MCH 31.8 26 - 34 pg    MCHC 33.6 31 - 37 g/dL    RDW 13.0 11.5 - 14.9 %    Platelets 116 068 - 571 k/uL    MPV 8.1 6.0 - 12.0 fL    Seg Neutrophils 48 36 - 66 %    Lymphocytes 40 24 - 44 %    Monocytes 9 (H) 1 - 7 %    Eosinophils % 2 0 - 4 %    Basophils 1 0 - 2 %    Segs Absolute 2.10 1.3 - 9.1 k/uL    Absolute Lymph # 1.80 1.0 - 4.8 k/uL    Absolute Mono # 0.40 0.1 - 1.3 k/uL    Absolute Eos # 0.10 0.0 - 0.4 k/uL    Basophils Absolute 0.00 0.0 - 0.2 k/uL   Comprehensive Metabolic Panel    Collection Time: 01/23/23  9:27 PM   Result Value Ref Range    Glucose 62 (L) 70 - 99 mg/dL    BUN 15 8 - 23 mg/dL    Creatinine 0.41 (L) 0.50 - 0.90 mg/dL    Est, Glom Filt Rate >60 >60 mL/min/1.73m2    Calcium 8.1 (L) 8.6 - 10.4 mg/dL    Sodium 140 135 - 144 mmol/L    Potassium 3.3 (L) 3.7 - 5.3 mmol/L    Chloride 103 98 - 107 mmol/L    CO2 18 (L) 20 - 31 mmol/L    Anion Gap 19 (H) 9 - 17 mmol/L    Alkaline Phosphatase 55 35 - 104 U/L    ALT <5 (L) 5 - 33 U/L    AST 15 <32 U/L    Total Bilirubin 0.2 (L) 0.3 - 1.2 mg/dL    Total Protein 5.9 (L) 6.4 - 8.3 g/dL    Albumin 3.8 3.5 - 5.2 g/dL   Lactate, Sepsis    Collection Time: 01/23/23  9:27 PM   Result Value Ref Range    Lactic Acid, Sepsis 4.3 (H) 0.5 - 1.9 mmol/L   Protime-INR Collection Time: 01/23/23  9:27 PM   Result Value Ref Range    Protime 13.1 11.8 - 14.6 sec    INR 1.0    Procalcitonin    Collection Time: 01/23/23  9:27 PM   Result Value Ref Range    Procalcitonin 0.03 <0.09 ng/mL   Troponin    Collection Time: 01/23/23  9:27 PM   Result Value Ref Range    Troponin, High Sensitivity 7 0 - 14 ng/L   Lipase    Collection Time: 01/23/23  9:27 PM   Result Value Ref Range    Lipase 29 13 - 60 U/L   Brain Natriuretic Peptide    Collection Time: 01/23/23  9:27 PM   Result Value Ref Range    Pro-BNP 68 <300 pg/mL   Culture, Blood 1    Collection Time: 01/23/23 10:00 PM    Specimen: Blood   Result Value Ref Range    Specimen Description . BLOOD     Special Requests LAC 10CC EACH     Culture NO GROWTH <24 HRS    Troponin    Collection Time: 01/23/23 10:09 PM   Result Value Ref Range    Troponin, High Sensitivity 8 0 - 14 ng/L   Acetaminophen Level    Collection Time: 01/23/23 10:09 PM   Result Value Ref Range    Acetaminophen Level 11 10 - 30 ug/mL   Ethanol    Collection Time: 01/23/23 10:09 PM   Result Value Ref Range    Ethanol 17 (H) <10 mg/dL    Ethanol percent 5.359 %   Salicylate    Collection Time: 01/23/23 10:09 PM   Result Value Ref Range    Salicylate Lvl <1 (L) 3 - 10 mg/dL   T4, Free    Collection Time: 01/23/23 10:09 PM   Result Value Ref Range    Thyroxine, Free 1.27 0.93 - 1.70 ng/dL   TSH    Collection Time: 01/23/23 10:09 PM   Result Value Ref Range    TSH 0.27 (L) 0.30 - 5.00 uIU/mL   Drug screen, tricyclic    Collection Time: 01/23/23 10:56 PM   Result Value Ref Range    Tricyclic Antidep,Urine NEGATIVE NEGATIVE   TOX SCR, BLD, ED    Collection Time: 01/23/23 11:00 PM   Result Value Ref Range    Acetaminophen Level PENDING ug/mL    Ethanol PENDING mg/dL    Ethanol percent PENDING %    Salicylate Lvl PENDING mg/dL    Toxic Tricyclic Sc,Blood WRONG TEST ORDERED    Urinalysis with Microscopic    Collection Time: 01/23/23 11:36 PM   Result Value Ref Range    Color, UA Yellow Yellow    Turbidity UA Clear Clear    Glucose, Ur LARGE (A) NEGATIVE    Bilirubin Urine NEGATIVE NEGATIVE    Ketones, Urine NEGATIVE NEGATIVE    Specific Gravity, UA 1.018 1.000 - 1.030    Urine Hgb NEGATIVE NEGATIVE    pH, UA 5.5 5.0 - 8.0    Protein, UA NEGATIVE NEGATIVE    Urobilinogen, Urine Normal Normal    Nitrite, Urine NEGATIVE NEGATIVE    Leukocyte Esterase, Urine NEGATIVE NEGATIVE    WBC, UA 0 TO 2 /HPF    RBC, UA 0 TO 2 /HPF    Casts UA 0 TO 2 /LPF    Epithelial Cells UA 0 TO 2 /HPF    Bacteria, UA MANY (A) None   URINE DRUG SCREEN    Collection Time: 01/23/23 11:37 PM   Result Value Ref Range    Amphetamine Screen, Ur NEGATIVE NEGATIVE    Barbiturate Screen, Ur POSITIVE (A) NEGATIVE    Benzodiazepine Screen, Urine NEGATIVE NEGATIVE    Cocaine Metabolite, Urine NEGATIVE NEGATIVE    Methadone Screen, Urine NEGATIVE NEGATIVE    Opiates, Urine POSITIVE (A) NEGATIVE    Phencyclidine, Urine NEGATIVE NEGATIVE    Cannabinoid Scrn, Ur NEGATIVE NEGATIVE    Oxycodone Screen, Ur POSITIVE (A) NEGATIVE    Fentanyl, Ur NEGATIVE NEGATIVE    Test Information       Assay provides medical screening only. The absence of expected drug(s) and/or metabolite(s) may indicate diluted or adulterated urine, limitations of testing or timing of collection. Lactate, Sepsis    Collection Time: 01/24/23 12:45 AM   Result Value Ref Range    Lactic Acid, Sepsis 2.4 (H) 0.5 - 1.9 mmol/L   POC Glucose Fingerstick    Collection Time: 01/24/23 12:52 AM   Result Value Ref Range    POC Glucose 74 65 - 105 mg/dL   POCT Glucose    Collection Time: 01/24/23 12:54 AM   Result Value Ref Range    Glucose 74 mg/dL    QC OK? yes    POC Glucose Fingerstick    Collection Time: 01/24/23  1:30 AM   Result Value Ref Range    POC Glucose 274 (H) 65 - 105 mg/dL   POCT Glucose    Collection Time: 01/24/23  1:31 AM   Result Value Ref Range    Glucose 274 mg/dL    QC OK?  yes    POC Glucose Fingerstick    Collection Time: 01/24/23  3:18 AM   Result Value Ref Range    POC Glucose 139 (H) 65 - 105 mg/dL   Lactic Acid    Collection Time: 01/24/23  5:09 AM   Result Value Ref Range    Lactic Acid 1.3 0.5 - 2.2 mmol/L   SPECIMEN REJECTION    Collection Time: 01/24/23  5:09 AM   Result Value Ref Range    Specimen Source . BLOOD     Ordered Test  BMPX     Reason for Rejection Unable to perform testing: Specimen hemolyzed. SPECIMEN REJECTION    Collection Time: 01/24/23  5:09 AM   Result Value Ref Range    Specimen Source . BLOOD     Ordered Test CDP     Reason for Rejection Unable to perform testing: Specimen clotted.     Basic Metabolic Panel w/ Reflex to MG    Collection Time: 01/24/23  6:42 AM   Result Value Ref Range    Glucose 82 70 - 99 mg/dL    BUN 11 8 - 23 mg/dL    Creatinine 0.41 (L) 0.50 - 0.90 mg/dL    Est, Glom Filt Rate >60 >60 mL/min/1.73m2    Calcium 7.2 (L) 8.6 - 10.4 mg/dL    Sodium 137 135 - 144 mmol/L    Potassium 5.5 (H) 3.7 - 5.3 mmol/L    Chloride 111 (H) 98 - 107 mmol/L    CO2 20 20 - 31 mmol/L    Anion Gap 6 (L) 9 - 17 mmol/L   TSH w/reflex to FT4    Collection Time: 01/24/23  8:13 AM   Result Value Ref Range    TSH 0.53 0.30 - 5.00 uIU/mL   Calcium, Ionized    Collection Time: 01/24/23  8:13 AM   Result Value Ref Range    Calcium, Ionized 1.14 1.10 - 1.33 mmol/L   CBC with Auto Differential    Collection Time: 01/24/23  8:13 AM   Result Value Ref Range    WBC 5.6 3.5 - 11.0 k/uL    RBC 4.01 4.0 - 5.2 m/uL    Hemoglobin 12.7 12.0 - 16.0 g/dL    Hematocrit 37.7 36 - 46 %    MCV 94.0 80 - 100 fL    MCH 31.7 26 - 34 pg    MCHC 33.7 31 - 37 g/dL    RDW 13.1 11.5 - 14.9 %    Platelets 469 966 - 057 k/uL    MPV 8.2 6.0 - 12.0 fL    Seg Neutrophils 63 36 - 66 %    Lymphocytes 26 24 - 44 %    Monocytes 8 (H) 1 - 7 %    Eosinophils % 2 0 - 4 %    Basophils 1 0 - 2 %    Segs Absolute 3.50 1.3 - 9.1 k/uL    Absolute Lymph # 1.50 1.0 - 4.8 k/uL    Absolute Mono # 0.40 0.1 - 1.3 k/uL    Absolute Eos # 0.10 0.0 - 0.4 k/uL    Basophils Absolute 0.00 0.0 - 0.2 k/uL Imaging/Diagnostics:  XR CHEST PORTABLE    Result Date: 1/23/2023  EXAMINATION: ONE XRAY VIEW OF THE CHEST 1/23/2023 9:29 pm COMPARISON: None. HISTORY: ORDERING SYSTEM PROVIDED HISTORY: chest pain TECHNOLOGIST PROVIDED HISTORY: chest pain Reason for Exam: Chest pain, took best image per pt condition FINDINGS: The cardiomediastinal silhouette is normal in size. There is prominence/fullness of the left hilar region, nonspecific. No focal airspace disease. No pleural effusion or pneumothorax. No evidence of acute osseous abnormality. 1.  Nonspecific fullness/prominence of the left hilum likely secondary to vessels and lymph nodes, although an underlying lesion is not excluded. Consider CT chest with contrast for further evaluation. 2.  No focal airspace disease. CT CHEST ABDOMEN PELVIS W CONTRAST Additional Contrast? None    Result Date: 1/23/2023  EXAMINATION: CT OF THE CHEST, ABDOMEN, AND PELVIS WITH CONTRAST 1/23/2023 10:01 pm TECHNIQUE: CT of the chest, abdomen and pelvis was performed with the administration of intravenous contrast. Multiplanar reformatted images are provided for review. Automated exposure control, iterative reconstruction, and/or weight based adjustment of the mA/kV was utilized to reduce the radiation dose to as low as reasonably achievable. COMPARISON: None HISTORY: ORDERING SYSTEM PROVIDED HISTORY: abdominal pain TECHNOLOGIST PROVIDED HISTORY: abdominal pain Reason for Exam: abdominal pain, abnormal chest xray Additional signs and symptoms: hip surgeries FINDINGS: Chest: Chest Wall and Thoracic Inlet: No axillary or supraclavicular lymphadenopathy. The thyroid gland is unremarkable. Mediastinum and Luisa: No mediastinal or hilar lymphadenopathy. Normal caliber of the thoracic aorta. Borderline dilation of the main pulmonary artery measuring up to 3.2 cm. No central pulmonary embolism. Borderline cardiomegaly. No pericardial effusion.  Lungs/Pleura: Mild centrilobular emphysema. Dependent hypoventilatory changes of the lungs. Bilateral lower lobe bronchiectasis. No evidence of left hilar mass. No pleural effusion. Bones: No suspicious osseous findings. Remote right lateral 5th-7th rib fractures. Subacute to remote right lateral 9th rib fracture. Abdomen/Pelvis: Organs: The liver is unremarkable. Mild intrahepatic biliary ductal prominence. The common bile duct is within normal limits in caliber. The gallbladder is unremarkable. The spleen, pancreas, and adrenal glands are unremarkable. Left upper pole renal cyst demonstrates hairline thin internal septations. No hydronephrosis or renal calculi. GI/Bowel: No evidence of bowel obstruction. There is mucosal hyperenhancement and wall thickening of the ascending colon compatible with colitis. Mild surrounding inflammatory stranding. Pelvis: Distended bladder. Retroverted uterus with an associated exophytic anterior uterine fibroid. Peritoneum/Retroperitoneum: No retroperitoneal, mesenteric, or pelvic lymphadenopathy. No free fluid or free intraperitoneal gas. The abdominal aorta demonstrates scattered atherosclerosis without aneurysm. Multiple lucent centered venous phleboliths are seen along the right pelvic sidewall. Bones/Soft Tissues: Age-indeterminate compression deformities of the L2 and L3 vertebral bodies. Moderate to severe degenerative disc disease at the L4-L5 level. 1. Mucosal hyperenhancement, moderate wall thickening, and surrounding inflammatory stranding of the ascending colon compatible with colitis. 2.  Minimal intrahepatic biliary ductal prominence without common bile duct dilation. Consider correlation with  LFTs if clinically indicated. 3.  No evidence of left hilar mass. Hilar prominence noted on same day chest radiograph corresponds to prominent hilar vessels. 4.  Age-indeterminate compression deformities of the L2 and L3 vertebral bodies. Correlate with clinical symptoms. Assessment :      Primary Problem  Colitis    Active Hospital Problems    Diagnosis Date Noted    Colitis [K52.9] 01/23/2023     Priority: Medium    Septic shock ([facility] [address] [A41.9, R65.21] 01/23/2023     Priority: Medium       Plan:     Patient status Admit as inpatient in the  Medical ICU    Septic shock likely secondary to colitis in the setting of hypovolemia  - Remains on Levophed  - IV cefepime Flagyl and vancomycin  - Blood culture negative thus far  - UA negative for UTI  - Lactic acid trending down  - White blood cell count within normal limits, procalcitonin within normal limits  - CT shows ascending colitis and possible CBD dilation  - Ordered GI panel and gallbladder ultrasound and fecal occult  - Aggressive IV fluid hydration 150 mL/h  - General surgery consulted    Parkinson's disease  - Per chart review, no record of current medication for Parkinson's disease  - Will need to contact patient's rehab facility to reconcile home meds    Bipolar 1 disorder  - Currently resides at inpatient psychiatric rehabilitation in [place]  - Mentioned in chart patient is on benztropine 2 mg and clonazepam 1 mg twice daily    History of seizures  - Admitted to [facility] [place] in June 2021 due to seizures  - Not currently on any at home seizure medication per chart review    Hypocalcemia  - Calcium of 7.2  - Ionized calcium of 1.14 within normal limits    Hypothyroidism  - Not currently on Synthroid at home  - TSH of 0.53    DVT prophylaxis: Lovenox 30 mg subcu daily  GI prophylaxis: Protonix 40 mg IV daily  Diet: N.p.o.  Full code  PT/OT/social work        Consultations:   IP CONSULT TO INTERNAL MEDICINE  IP CONSULT TO CRITICAL CARE  IP CONSULT  [place] TO DOSE VANCOMYCIN  IP CONSULT TO [address] [facility]    Patient is admitted as inpatient status because of co-morbiditieslisted above, severity of signs and symptoms as outlined, requirement for current medical therapies and most importantly because of direct risk to patient if care not provided in a hospital setting. Joan Brasher MD  1/24/2023  9:37 AM    Copy sent to Dr. Enriqueta Quintana primary care provider on file. Attending Physician Statement  I have discussed the care of Antonio Nelson and I have examined the patient myselft and taken ros and hpi , including pertinent history and exam findings,  with the resident. I have reviewed the key elements of all parts of the encounter with the resident. I agree with the assessment, plan and orders as documented by the resident.   Patient, transferred from nursing home  Has multiple medical problem which include history of Parkinson disease, restless leg syndrome, hypothyroidism, history of seizure disorder  Patient, has abdominal pain, blood in stools, diarrhea going on for last 3 to 4 weeks  Was not prescribed any antibiotics in long time  Patient told me she had a colonoscopy almost a month ago at [facility] clinic  We will try to get records  We will try to get medication list from her nursing home  Patient was hypotensive in the emergency room, elevated lactic acid which is improving, fluid responsive  Given fluid resuscitation  Centrally was placed  On Levophed  Patient, started on broad-spectrum antibiotic we will discontinue vancomycin and continue with cefepime and Flagyl  Molecular  panel from stools, C. difficile pending  Patient critically sick, requiring pressors  Prognosis guarded  CC time 35 minutes    Electronically signed by Lupe Almazan MD

## 2023-01-24 NOTE — CONSULTS
[facility] -   Infectious diseases evaluation  admission date 1/23/2023    reason for consultation:   Shock    Impression :   Current:  Shock could be related to dehydration rule out sepsis. Possible colitis, stool studies pending  Possible intrahepatic biliary ductal dilatation on CT abdomen/unremarkable LFTs on 1/23/2023. Parkinson's disease  History of seizure  Bipolar disorder  Penicillin/ertapenem allergy    Recommendations   Continue IV vancomycin, cefepime and Flagyl for now. Follow blood and urine cultures  Gallbladder ultrasound was ordered  Stool for C. difficile and GI panel pending  GI consulted  Follow lactic acid  Continue supportive care    Infection Control Recommendations   [place] Precautions      Antimicrobial Stewardship Recommendations   Simplification of therapy  Targeted therapy      History of Present Illness:   Initial history:  Kristie Soto is a 63y.o.-year-old female was brought to the hospital via EMS from a nursing facility for generalized weakness associated with nausea, diarrhea and vomiting for around 3 weeks prior to admission. Symptoms moderate to severe, no alleviating or aggravating factors . The patient was found to be hypotensive by EMS with reported systolic blood pressure in the 60s. She received Fluid resuscitation and was started on Levophed. The patient is poor historian, states that she had history of colitis, had no bowel movement since arrival to the ICU, stool studies not sent. Initial lactic acid was 4.3 then 2.4. Initial WBC 4.5, hemoglobin 12.7, creatinine 0.41, procalcitonin 0.03  Chest x-ray showed left helium fullness. CT chest abdomen and pelvis suggestive of colitis, minimal intrahepatic biliary ductal prominence, no left hilar mass, L2-L3 compression deformities.   Ethanol level was 17  Drug screen was positive for opiates and oxycodone  Urinalysis showed many bacteria, negative nitrate, negative leukocyte esterase, 0-2 WBC  Interval changes  1/24/2023   Patient Vitals for the past 8 hrs:   BP Temp Temp src Pulse Resp SpO2 Height Weight   01/24/23 1100 (!) 78/45 -- -- 73 17 99 % -- --   01/24/23 1000 (!) 117/52 -- -- 64 17 98 % -- --   01/24/23 0900 (!) 79/59 -- -- 74 10 99 % -- --   01/24/23 0820 (!) 153/120 -- -- 72 14 99 % -- --   01/24/23 0800 (!) 116/55 97.7 °F (36.5 °C) Oral 66 13 98 % -- --   01/24/23 0745 -- 97.7 °F (36.5 °C) Oral -- -- -- -- --   01/24/23 0700 128/62 -- -- 64 16 96 % -- --   01/24/23 0645 132/66 -- -- 64 11 98 % -- --   01/24/23 0630 (!) 110/51 -- -- 71 13 -- -- --   01/24/23 0619 -- 97.3 °F (36.3 °C) Axillary -- -- -- -- --   01/24/23 0615 (!) 99/42 -- -- 62 15 97 % -- --   01/24/23 0600 (!) 100/51 -- -- 61 15 97 % -- --   01/24/23 0545 (!) 87/56 -- -- 61 14 97 % -- --   01/24/23 0530 (!) 90/48 -- -- 60 14 97 % -- --   01/24/23 0515 (!) 86/48 -- -- 61 17 98 % -- --   01/24/23 0500 (!) 59/42 -- -- 69 14 95 % -- --   01/24/23 0445 (!) 73/34 -- -- 74 15 96 % -- --   01/24/23 0430 (!) 71/39 -- -- 72 13 99 % -- --   01/24/23 0415 98/82 -- -- 66 16 96 % -- --   01/24/23 0400 115/68 -- -- 67 13 99 % -- --   01/24/23 0345 (!) 96/34 -- -- 60 14 100 % -- --   01/24/23 0330 109/71 (!) 94.3 °F (34.6 °C) Axillary 60 25 98 % 4' 11\" (1.499 m) 104 lb 8 oz (47.4 kg)         I have personally reviewed the past medical history, past surgical history, medications, social history, and family history, and I haveupdated the database accordingly. Allergies:   Penicillins, Ertapenem, Naproxen, Valproic acid, and Lactose     Review of Systems:     Review of Systems  As per history of present illness, other than above 12 system review was negative. Physical Examination :       Physical Exam  Constitutional:       General: She is not in acute distress. HENT:      Head: Normocephalic and atraumatic.       Right Ear: External ear normal.      Left Ear: External ear normal.   Eyes:      General: No scleral icterus. Conjunctiva/sclera: Conjunctivae normal.   Cardiovascular:      Rate and Rhythm: Normal rate and regular rhythm. Heart sounds: No murmur heard. Pulmonary:      Effort: Pulmonary effort is normal. No respiratory distress. Abdominal:      Palpations: Abdomen is soft. Tenderness: There is abdominal tenderness. There is no right CVA tenderness, left CVA tenderness, guarding or rebound. Musculoskeletal:         General: No swelling. Cervical back: Neck supple. No rigidity. Right lower leg: No edema. Left lower leg: No edema. Skin:     Coloration: Skin is not jaundiced. Findings: No bruising. Neurological:      General: No focal deficit present. Mental Status: She is alert. Past Medical History:   History reviewed. No pertinent past medical history. Past Surgical  History:   History reviewed. No pertinent surgical history.     Medications:      sodium chloride flush  5-40 mL IntraVENous 2 times per day    enoxaparin  30 mg SubCUTAneous Daily    metroNIDAZOLE  500 mg IntraVENous Q8H    pantoprazole (PROTONIX) 40 mg injection  40 mg IntraVENous Daily    cefTRIAXone (ROCEPHIN) IV  1,000 mg IntraVENous Q24H    [START ON 1/25/2023] levothyroxine  37.5 mcg Oral Daily       Social History:     Social History     Socioeconomic History    Marital status:      Spouse name: Not on file    Number of children: Not on file    Years of education: Not on file    Highest education level: Not on file   Occupational History    Not on file   Tobacco Use    Smoking status: Every Day     Packs/day: 0.25     Types: Cigarettes    Smokeless tobacco: Never   Substance and Sexual Activity    Alcohol use: Not on file    Drug use: Not on file    Sexual activity: Not on file   Other Topics Concern    Not on file   Social History Narrative    Not on file     Social Determinants of Health     Financial Resource Strain: Not on file   Food Insecurity: Not on file   Transportation Needs: Not on file   Physical Activity: Not on file   Stress: Not on file   Social Connections: Not on file   Intimate Partner Violence: Not on file   Housing Stability: Not on file       Family History:   History reviewed. No pertinent family history. Medical Decision Making:   I have independently reviewed/ordered the following labs:    CBC with Differential:   Recent Labs     01/23/23 2127 01/24/23 0813   WBC 4.5 5.6   HGB 12.7 12.7   HCT 37.7 37.7    274   LYMPHOPCT 40 26   MONOPCT 9* 8*     BMP:  Recent Labs     01/24/23  0642 01/24/23  0813    142   K 5.5* 4.9   * 113*   CO2 20 21   BUN 11 11   CREATININE 0.41* 0.46*     Hepatic Function Panel:   Recent Labs     01/23/23 2127   PROT 5.9*   LABALBU 3.8   BILITOT 0.2*   ALKPHOS 55   ALT <5*   AST 15     No results for input(s): RPR in the last 72 hours. No results for input(s): HIV in the last 72 hours. No results for input(s): BC in the last 72 hours. Lab Results   Component Value Date/Time    CREATININE 0.46 01/24/2023 08:13 AM    GLUCOSE 87 01/24/2023 08:13 AM    GLUCOSE 121 09/06/2011 05:00 AM       Detailed results: Thank you for allowing us to participate in the care of this patient. Please call with questions. This note is created with the assistance of a speech recognition program.  While intending to generate adocument that actually reflects the content of the visit, the document can still have some errors including those of syntax and sound a like substitutions which may escape proof reading. It such instances, actual meaningcan be extrapolated by contextual diversion.     Carine Prather MD  Office: [phone]  Perfect serve / office [phone]

## 2023-01-24 NOTE — PROGRESS NOTES
Vinicio from [facility] called, he is the director. She informed RN that patient was completely fine yesterday and then she received a visitor and became out of it. She stated that this was very unusual and was curious if a tox screen was done on the patient. She informed me that patient is very manipulative. Informed Vinicio that we have no history or med list on the patient currently and would like for her to Fax over her information. She also stated that she is going to call the [facility] and inform him.

## 2023-01-24 NOTE — PROGRESS NOTES
Comprehensive Nutrition Assessment    Type and Reason for Visit:  Initial, Positive Nutrition Screen (wt loss, poor appetite)    Nutrition Recommendations/Plan:   Recommend Low Fiber diet with Ensure Clear supplements all trays when advanced to solid food. Malnutrition Assessment:  Malnutrition Status: At risk for malnutrition (Comment) (01/24/23 1208)    Context:  Acute Illness     Findings of the 6 clinical characteristics of malnutrition:  Energy Intake:  Mild decrease in energy intake (Comment)  Weight Loss:  No significant weight loss     Body Fat Loss:  Mild body fat loss Orbital   Muscle Mass Loss:  Unable to assess    Fluid Accumulation:  No significant fluid accumulation     Strength:  Not Performed    Nutrition Assessment:    Pt was admitted from [facility] due to Septic Shock/Hypovolemia/Colitis. Pt is currently npo but states she is very hungry. Pt has no teeth but states she can eat meat with no difiiculty. After some questioning, it appears pt can tolerate small amounts of dairy but does not drink milk. Nutrition Related Findings:    no edema, labs: K 4.9, Meds: Reviewed, PMH: Parkinsons Wound Type: None       Current Nutrition Intake & Therapies:    Average Meal Intake: NPO     Diet NPO    Anthropometric Measures:  Height: 4' 11\" (149.9 cm)  Ideal Body Weight (IBW): 98 #   Admission Body Weight: 104 lb (47.2 kg)  Current Body Weight: 104 lb (47.2 kg),   IBW. Weight Source: Bed Scale  Current BMI (kg/m2): 21  Usual Body Weight:  (#)                       BMI Categories: Normal Weight (BMI 18.5-24. 9)    Estimated Daily Nutrient Needs:  Energy Requirements Based On: Formula  Weight Used for Energy Requirements: Admission  Energy (kcal/day): [place] x 1.2= 1100 kcal  Weight Used for Protein Requirements: Admission  Protein (g/day): 1.5g/kg= 70 g       Nutrition Diagnosis:   Predicted inadequate energy intake related to  (current medical condition) as evidenced by poor intake prior to admission    Nutrition Interventions:   Food and/or Nutrient Delivery: Start Oral Diet, Start Oral Nutrition Supplement  Nutrition Education/Counseling: Education completed (discussed allergies/preferences)  Coordination of Nutrition Care: Continue to monitor while inpatient       Goals:     Goals: Initiate PO diet       Nutrition Monitoring and Evaluation:      Food/Nutrient Intake Outcomes: Diet Advancement/Tolerance  Physical Signs/Symptoms Outcomes: Biochemical Data, Chewing or Swallowing, GI Status, Fluid Status or Edema, Skin, Weight    Discharge Planning:     Too soon to determine     Everette Gonzales, [address] [place] [address],   Contact: [phone]

## 2023-01-24 NOTE — PROGRESS NOTES
Writer witnessed patient to be a oscar filled person who not only received prayer but prayed for writer, as well. Patient seemed to be oscar filled shown through prayer and conversation. Even in her current situation patient had a positive and hope filled trust in God which she told writer she practiced daily with her sister and a prayer group.   01/24/23 1031   Encounter Summary   Encounter Overview/Reason  Initial Encounter;Spiritual/Emotional Needs   Service Provided For: Patient   Referral/Consult From: Nurse   Support System Family members   Last Encounter  01/24/23   Complexity of Encounter Low   Encounter    Type Initial Screen/Assessment   Spiritual/Emotional needs   Type Spiritual Distress;Spiritual Support   Assessment/Intervention/Outcome   Assessment Coping;Peaceful   Intervention Active listening;Discussed belief system/[place]ahai practices/oscar;Discussed relationship with God;Explored/Affirmed feelings, thoughts, concerns;Prayer (assurance of)/[place] [employer];Sustaining Presence/Ministry of presence   Outcome Comfort;Engaged in conversation;Expressed feelings, needs, and concerns;Peace; Coping

## 2023-01-24 NOTE — PLAN OF CARE
Problem: Discharge Planning  Goal: Discharge to home or other facility with appropriate resources  Outcome: Progressing  Flowsheets  Taken 1/24/2023 0330  Discharge to home or other facility with appropriate resources:   Identify barriers to discharge with patient and caregiver   Refer to discharge planning if patient needs post-hospital services based on physician order or complex needs related to functional status, cognitive ability or social support system  Taken 1/24/2023 0321  Discharge to home or other facility with appropriate resources:   Identify barriers to discharge with patient and caregiver   Refer to discharge planning if patient needs post-hospital services based on physician order or complex needs related to functional status, cognitive ability or social support system     Problem: Skin/Tissue Integrity  Goal: Absence of new skin breakdown  Description: 1. Monitor for areas of redness and/or skin breakdown  2. Assess vascular access sites hourly  3. Every 4-6 hours minimum:  Change oxygen saturation probe site  4. Every 4-6 hours:  If on nasal continuous positive airway pressure, respiratory therapy assess nares and determine need for appliance change or resting period.   Outcome: Progressing     Problem: Safety - Adult  Goal: Free from fall injury  Outcome: Progressing     Problem: ABCDS Injury Assessment  Goal: Absence of physical injury  Outcome: Progressing

## 2023-01-24 NOTE — PROGRESS NOTES
Pharmacy Medication History Note      List of current medications patient is taking is complete. Source of information: [facility], [facility]    Changes made to medication list:  Medications removed (include reason, ex. therapy complete or physician discontinued, noncompliance):  None    Medications added/doses adjusted:  Adjusted Synthroid to 37.5 mcg daily  Added Benztropine   Added Calcium Carbonate  Added Vitamin D3 tab  Added Magnesium Oxide  Added Methocarbamol  Added Omeprazole  Added Zofran  Added Potassium Chloride  Added Senna  Added Clonazepam   Added Linzess  Added Ropinirole   Added Voltaren gel     Other notes (ex. Recent course of antibiotics, Coumadin dosing):  Clonazepam 1 mg tab last filled 1/23/23  #60  for  30 day supply       Current Home Medication List at Time of Admission:  Prior to Admission medications    Medication Sig Start Date End Date Taking?  Authorizing Provider   benztropine (COGENTIN) 2 MG tablet Take 2 mg by mouth 2 times daily   Yes Historical Provider, MD   calcium carbonate 600 MG TABS tablet Take 1 tablet by mouth daily   Yes Historical Provider, MD   vitamin D (CHOLECALCIFEROL) 25 MCG (1000 UT) TABS tablet Take 2,000 Units by mouth daily   Yes Historical Provider, MD   diclofenac sodium (VOLTAREN) 1 % GEL Apply topically 2 times daily   Yes Historical Provider, MD   magnesium oxide (MAG-OX) 400 MG tablet Take 400 mg by mouth daily   Yes Historical Provider, MD   methocarbamol (ROBAXIN) 750 MG tablet Take 750 mg by mouth daily as needed   Yes Historical Provider, MD   omeprazole (PRILOSEC) 20 MG delayed release capsule Take 20 mg by mouth daily   Yes Historical Provider, MD   ondansetron (ZOFRAN) 4 MG tablet Take 4 mg by mouth every 8 hours as needed for Nausea or Vomiting   Yes Historical Provider, MD   potassium chloride (KLOR-CON M) 20 MEQ extended release tablet Take 20 mEq by mouth daily   Yes Historical Provider, MD   senna-docusate (PERICOLACE) 8.6-50 MG per tablet Take 2 tablets by mouth daily   Yes Historical Provider, MD   clonazePAM (KLONOPIN) 1 MG tablet Take 1 mg by mouth 2 times daily. Yes Historical Provider, MD   linaclotide (LINZESS) 290 MCG CAPS capsule Take 290 mcg by mouth every morning (before breakfast)   Yes Historical Provider, MD   rOPINIRole (REQUIP) 0.5 MG tablet Take 0.5 mg by mouth 3 times daily   Yes Historical Provider, MD   levothyroxine (SYNTHROID) 75 MCG tablet Take 37.5 mcg by mouth Daily 4/2/22   Historical Provider, MD         Please let me know if you have any questions about this encounter. Thank you!     Electronically signed by NICK Martin [facility] on 1/24/2023 at 11:10 AM

## 2023-01-24 NOTE — ED PROVIDER NOTES
EMERGENCY DEPARTMENT ENCOUNTER   ATTENDING ATTESTATION     Pt Name: Antonio Nelson  MRN: [id number]  [place] 1960  Date of evaluation: 1/23/23       Antonio Nelson is a 63 y.o. female who presents with Fatigue and Other (Hypotension)    Parkinsons     Chest and abdominal pain, generalized weakness, hypotensive    Plan is broad workup for hypotension        MDM:     Work-up reveals a colitis patient has low blood pressure readings did have improvement after IV fluids but central line placed given any need for pressors    Broad-spectrum antibiotics initiated received over 30 cc/kg bolus and cultures obtained    Admitted to ICU for close monitoring    Repeat lactic improving to 2.4 hemodynamics improving with IV fluids    Due to the immediate potential for life-threatening deterioration due to septic shock , I spent 51 minutes providing critical care. This time is excluding time spent performing procedures. Vitals:   Vitals:    01/23/23 2246 01/23/23 2300 01/23/23 2343 01/24/23 0001   BP:  87/61 (!) 74/29 101/72   Pulse:  65 65 67   Resp:  18 16 22   Temp:   (!) 93.7 °F (34.3 °C)    TempSrc:   Rectal    SpO2: 97% 94% 99% 96%   Weight:       Height:             I personally saw and examined the patient. I have reviewed and agree with the resident's findings, including all diagnostic interpretations and treatment plan as written. I was present for the key portions of any procedures performed and the inclusive time noted for any critical care statement. The care is provided during an unprecedented national emergency due to the novel coronavirus, COVID 19.   Sean Jimenez MD  Attending Emergency Physician           Sean Jimenez MD  01/24/23 6161

## 2023-01-24 NOTE — ED NOTES
Attempted to contact [id number] [facility] care where patient lives for possible updated medication list no answer message left      Ellie Valderrama [facility]  01/24/23 0238

## 2023-01-24 NOTE — ED NOTES
Spoke with Gris Dumont NP on the phone to clarify potassium replacement orders due to potassium 3.3 and patient is NPO and she said she would put in new orders.      Romie Hanson RN  01/24/23 9626

## 2023-01-24 NOTE — ED NOTES
Report given to [facility]. Report method in person   The following was reviewed with receiving RN:   Current vital signs:  BP 87/61   Pulse 65   Temp 98.3 °F (36.8 °C)   Resp 18   Ht 4' 11\" (1.499 m)   Wt 75 lb (34 kg)   SpO2 94%   BMI 15.15 kg/m²                      Any medication or safety alerts were reviewed. Any pending diagnostics and notifications were also reviewed, as well as any safety concerns or issues, abnormal labs, abnormal imaging, and abnormal assessment findings. Questions were answered.             Jere Rodriguez RN  01/23/23 2581

## 2023-01-24 NOTE — ED NOTES
A/o x 3. Skin warm and dry. Respirations quiet and easy. Lungs diminished t/o posteriorly. Heart sounds regular. Cardiac monitor shows NSR. Bowel sounds present x 4 quadrants, abdomen is soft, non-tender. Peripheral pulses palpable, neg edema. Raymundo draining clear, light yellow urine.      Ruthy Mcintosh RN  01/24/23 4804

## 2023-01-25 PROBLEM — I95.9 HYPOTENSION: Status: ACTIVE | Noted: 2023-01-25

## 2023-01-25 LAB
ABSOLUTE EOS #: 0.2 K/UL (ref 0–0.4)
ABSOLUTE LYMPH #: 1.7 K/UL (ref 1–4.8)
ABSOLUTE MONO #: 0.4 K/UL (ref 0.1–1.3)
ANION GAP SERPL CALCULATED.3IONS-SCNC: 7 MMOL/L (ref 9–17)
BASOPHILS # BLD: 1 % (ref 0–2)
BASOPHILS ABSOLUTE: 0.1 K/UL (ref 0–0.2)
BUN BLDV-MCNC: 6 MG/DL (ref 8–23)
CALCIUM SERPL-MCNC: 7.5 MG/DL (ref 8.6–10.4)
CHLORIDE BLD-SCNC: 115 MMOL/L (ref 98–107)
CO2: 19 MMOL/L (ref 20–31)
CREAT SERPL-MCNC: <0.4 MG/DL (ref 0.5–0.9)
EOSINOPHILS RELATIVE PERCENT: 3 % (ref 0–4)
GFR SERPL CREATININE-BSD FRML MDRD: ABNORMAL ML/MIN/1.73M2
GLUCOSE BLD-MCNC: 87 MG/DL (ref 70–99)
HCT VFR BLD CALC: 33.6 % (ref 36–46)
HEMOGLOBIN: 11 G/DL (ref 12–16)
LACTIC ACID: 0.7 MMOL/L (ref 0.5–2.2)
LYMPHOCYTES # BLD: 30 % (ref 24–44)
MCH RBC QN AUTO: 31.5 PG (ref 26–34)
MCHC RBC AUTO-ENTMCNC: 32.6 G/DL (ref 31–37)
MCV RBC AUTO: 96.5 FL (ref 80–100)
MONOCYTES # BLD: 8 % (ref 1–7)
PDW BLD-RTO: 13.5 % (ref 11.5–14.9)
PLATELET # BLD: 242 K/UL (ref 150–450)
PMV BLD AUTO: 9 FL (ref 6–12)
POTASSIUM SERPL-SCNC: 3.6 MMOL/L (ref 3.7–5.3)
RBC # BLD: 3.48 M/UL (ref 4–5.2)
SEG NEUTROPHILS: 58 % (ref 36–66)
SEGMENTED NEUTROPHILS ABSOLUTE COUNT: 3.1 K/UL (ref 1.3–9.1)
SODIUM BLD-SCNC: 141 MMOL/L (ref 135–144)
WBC # BLD: 5.5 K/UL (ref 3.5–11)

## 2023-01-25 PROCEDURE — 6360000002 HC RX W HCPCS: Performed by: NURSE PRACTITIONER

## 2023-01-25 PROCEDURE — 99232 SBSQ HOSP IP/OBS MODERATE 35: CPT | Performed by: INTERNAL MEDICINE

## 2023-01-25 PROCEDURE — 6360000002 HC RX W HCPCS

## 2023-01-25 PROCEDURE — 2500000003 HC RX 250 WO HCPCS: Performed by: STUDENT IN AN ORGANIZED HEALTH CARE EDUCATION/TRAINING PROGRAM

## 2023-01-25 PROCEDURE — 6370000000 HC RX 637 (ALT 250 FOR IP)

## 2023-01-25 PROCEDURE — 2580000003 HC RX 258: Performed by: INTERNAL MEDICINE

## 2023-01-25 PROCEDURE — 36415 COLL VENOUS BLD VENIPUNCTURE: CPT

## 2023-01-25 PROCEDURE — 2580000003 HC RX 258: Performed by: NURSE PRACTITIONER

## 2023-01-25 PROCEDURE — 83605 ASSAY OF LACTIC ACID: CPT

## 2023-01-25 PROCEDURE — C9113 INJ PANTOPRAZOLE SODIUM, VIA: HCPCS

## 2023-01-25 PROCEDURE — 2060000000 HC ICU INTERMEDIATE R&B

## 2023-01-25 PROCEDURE — 2580000003 HC RX 258

## 2023-01-25 PROCEDURE — 94761 N-INVAS EAR/PLS OXIMETRY MLT: CPT

## 2023-01-25 PROCEDURE — 2500000003 HC RX 250 WO HCPCS: Performed by: NURSE PRACTITIONER

## 2023-01-25 PROCEDURE — 85025 COMPLETE CBC W/AUTO DIFF WBC: CPT

## 2023-01-25 PROCEDURE — 6360000002 HC RX W HCPCS: Performed by: INTERNAL MEDICINE

## 2023-01-25 PROCEDURE — 80048 BASIC METABOLIC PNL TOTAL CA: CPT

## 2023-01-25 PROCEDURE — A4216 STERILE WATER/SALINE, 10 ML: HCPCS

## 2023-01-25 PROCEDURE — 6370000000 HC RX 637 (ALT 250 FOR IP): Performed by: NURSE PRACTITIONER

## 2023-01-25 PROCEDURE — 99233 SBSQ HOSP IP/OBS HIGH 50: CPT | Performed by: INTERNAL MEDICINE

## 2023-01-25 PROCEDURE — 99222 1ST HOSP IP/OBS MODERATE 55: CPT | Performed by: INTERNAL MEDICINE

## 2023-01-25 RX ORDER — SODIUM CHLORIDE, SODIUM LACTATE, POTASSIUM CHLORIDE, CALCIUM CHLORIDE 600; 310; 30; 20 MG/100ML; MG/100ML; MG/100ML; MG/100ML
INJECTION, SOLUTION INTRAVENOUS CONTINUOUS
Status: DISCONTINUED | OUTPATIENT
Start: 2023-01-25 | End: 2023-01-26 | Stop reason: HOSPADM

## 2023-01-25 RX ORDER — POTASSIUM CHLORIDE 20 MEQ/1
40 TABLET, EXTENDED RELEASE ORAL ONCE
Status: COMPLETED | OUTPATIENT
Start: 2023-01-25 | End: 2023-01-25

## 2023-01-25 RX ORDER — MIDODRINE HYDROCHLORIDE 10 MG/1
10 TABLET ORAL
Status: DISCONTINUED | OUTPATIENT
Start: 2023-01-25 | End: 2023-01-26 | Stop reason: HOSPADM

## 2023-01-25 RX ORDER — MIDODRINE HYDROCHLORIDE 10 MG/1
TABLET ORAL
Status: COMPLETED
Start: 2023-01-25 | End: 2023-01-25

## 2023-01-25 RX ADMIN — ROPINIROLE HYDROCHLORIDE 0.5 MG: 1 TABLET, FILM COATED ORAL at 13:18

## 2023-01-25 RX ADMIN — ACETAMINOPHEN 650 MG: 325 TABLET ORAL at 04:38

## 2023-01-25 RX ADMIN — SODIUM CHLORIDE: 9 INJECTION, SOLUTION INTRAVENOUS at 03:08

## 2023-01-25 RX ADMIN — SODIUM CHLORIDE, POTASSIUM CHLORIDE, SODIUM LACTATE AND CALCIUM CHLORIDE: 600; 310; 30; 20 INJECTION, SOLUTION INTRAVENOUS at 20:58

## 2023-01-25 RX ADMIN — CEFTRIAXONE SODIUM 1000 MG: 1 INJECTION, POWDER, FOR SOLUTION INTRAMUSCULAR; INTRAVENOUS at 13:19

## 2023-01-25 RX ADMIN — SODIUM CHLORIDE, POTASSIUM CHLORIDE, SODIUM LACTATE AND CALCIUM CHLORIDE: 600; 310; 30; 20 INJECTION, SOLUTION INTRAVENOUS at 09:43

## 2023-01-25 RX ADMIN — BENZTROPINE MESYLATE 2 MG: 2 TABLET ORAL at 07:40

## 2023-01-25 RX ADMIN — POTASSIUM CHLORIDE 40 MEQ: 1500 TABLET, EXTENDED RELEASE ORAL at 09:36

## 2023-01-25 RX ADMIN — PANTOPRAZOLE SODIUM 40 MG: 40 INJECTION, POWDER, FOR SOLUTION INTRAVENOUS at 07:39

## 2023-01-25 RX ADMIN — Medication 1 MCG/MIN: at 03:22

## 2023-01-25 RX ADMIN — MIDODRINE HYDROCHLORIDE 10 MG: 10 TABLET ORAL at 17:24

## 2023-01-25 RX ADMIN — ROPINIROLE HYDROCHLORIDE 0.5 MG: 1 TABLET, FILM COATED ORAL at 07:40

## 2023-01-25 RX ADMIN — FENTANYL CITRATE 25 MCG: 0.05 INJECTION, SOLUTION INTRAMUSCULAR; INTRAVENOUS at 11:40

## 2023-01-25 RX ADMIN — ROPINIROLE HYDROCHLORIDE 0.5 MG: 1 TABLET, FILM COATED ORAL at 20:50

## 2023-01-25 RX ADMIN — MIDODRINE HYDROCHLORIDE 10 MG: 10 TABLET ORAL at 11:40

## 2023-01-25 RX ADMIN — LEVOTHYROXINE SODIUM 37.5 MCG: 0.07 TABLET ORAL at 05:06

## 2023-01-25 RX ADMIN — MIDODRINE HYDROCHLORIDE 10 MG: 10 TABLET ORAL at 07:40

## 2023-01-25 RX ADMIN — METRONIDAZOLE 500 MG: 500 INJECTION, SOLUTION INTRAVENOUS at 07:38

## 2023-01-25 RX ADMIN — FENTANYL CITRATE 25 MCG: 0.05 INJECTION, SOLUTION INTRAMUSCULAR; INTRAVENOUS at 20:50

## 2023-01-25 RX ADMIN — SODIUM CHLORIDE, PRESERVATIVE FREE 10 ML: 5 INJECTION INTRAVENOUS at 20:50

## 2023-01-25 RX ADMIN — METRONIDAZOLE 500 MG: 500 INJECTION, SOLUTION INTRAVENOUS at 17:24

## 2023-01-25 RX ADMIN — SODIUM CHLORIDE, PRESERVATIVE FREE 10 ML: 5 INJECTION INTRAVENOUS at 07:41

## 2023-01-25 RX ADMIN — BENZTROPINE MESYLATE 2 MG: 2 TABLET ORAL at 20:50

## 2023-01-25 RX ADMIN — MIDODRINE HYDROCHLORIDE: 10 TABLET ORAL at 09:21

## 2023-01-25 RX ADMIN — ENOXAPARIN SODIUM 30 MG: 100 INJECTION SUBCUTANEOUS at 07:40

## 2023-01-25 ASSESSMENT — PAIN SCALES - GENERAL
PAINLEVEL_OUTOF10: 0
PAINLEVEL_OUTOF10: 0
PAINLEVEL_OUTOF10: 6
PAINLEVEL_OUTOF10: 8

## 2023-01-25 ASSESSMENT — PAIN DESCRIPTION - LOCATION
LOCATION: ABDOMEN
LOCATION: BACK

## 2023-01-25 ASSESSMENT — ENCOUNTER SYMPTOMS
COLOR CHANGE: 0
SHORTNESS OF BREATH: 0
DIARRHEA: 1
DIARRHEA: 0
WHEEZING: 0
CONSTIPATION: 0
BLOOD IN STOOL: 0
EYES NEGATIVE: 1
VOMITING: 0
CHEST TIGHTNESS: 0
TROUBLE SWALLOWING: 1
COUGH: 0
NAUSEA: 0
ABDOMINAL PAIN: 1
RESPIRATORY NEGATIVE: 1
ABDOMINAL DISTENTION: 0
CONSTIPATION: 1
BACK PAIN: 1
NAUSEA: 1

## 2023-01-25 ASSESSMENT — PAIN DESCRIPTION - DESCRIPTORS: DESCRIPTORS: DISCOMFORT

## 2023-01-25 NOTE — PROGRESS NOTES
Patient was seen and examined. Complaining of abdominal pain. No nausea vomiting. Afebrile vital signs are stable. Abdomen is soft nondistended nonspecific generalized tenderness. Extremity nontender. Blood work reviewed. GI consult. Medical management. Continue liquid diet. Surgically stable for transfer out of the ICU.

## 2023-01-25 NOTE — CONSULTS
GI Consult Note:    Name: Jovan Pacheco  MRN: [id number]     [place]: [de-identified]  Room: 2002/2002-01    Admit Date: 1/23/2023  PCP: No primary care provider on file. Physician Requesting Consult: Yaima Lechuga MD     Reason for Consult: Questionable colitis basing on the CT imaging. Chief Complaint:     Chief Complaint   Patient presents with    Fatigue    Other     Hypotension       History Obtained From:     patient, electronic medical record, nursing staff    History of Present Illness:      Jovan Pacheco is a  63 y.o.  female who presents with Fatigue and Other (Hypotension)      Symptoms:  Patient seen at [facility] intensive care unit. Apparently patient was sent from [facility] with a history of weakness tiredness. EMS noted that patient was hypotensive at the facility. She was given fluids and transported to the emergency room. In the emergency room patient had a CT scan of the abdomen done that revealed questionable signs of colitis. On further discussion patient states that she has a Parkinson's disease. He has a generalized pain all over the body including the chest, extremities, and abdomen. As far as the abdomen pain is concerned, this appears to be chronic. Nonlocalized. No abdominal distention. She states that she may have some nausea however no emesis. She does have some swallowing issues which appears to be chronic. On further discussion patient indicated she was told that because of Parkinson's disease he has following issues. He is under the care of a gastroenterologist and he was seen her recently. And also had a colonoscopy done in November 2022 and the findings not known at this time. We are trying to get the records from that physician. During my visit around 7:45 AM, patient appears comfortable. Able to take liquid diet with help. No emesis. Patient states that her appetite has been decreased.   Hematologic about 7 to 8 pounds in the last few weeks. On reviewing the chart it appears that patient lies down on the couch for long time. Past Medical History:     History reviewed. No pertinent past medical history. Past Surgical History:     History reviewed. No pertinent surgical history. Medications Prior to Admission:       Prior to Admission medications    Medication Sig Start Date End Date Taking? Authorizing Provider   benztropine (COGENTIN) 2 MG tablet Take 2 mg by mouth 2 times daily   Yes Historical Provider, MD   calcium carbonate 600 MG TABS tablet Take 1 tablet by mouth daily   Yes Historical Provider, MD   vitamin D (CHOLECALCIFEROL) 25 MCG (1000 UT) TABS tablet Take 2,000 Units by mouth daily   Yes Historical Provider, MD   diclofenac sodium (VOLTAREN) 1 % GEL Apply topically 2 times daily   Yes Historical Provider, MD   magnesium oxide (MAG-OX) 400 MG tablet Take 400 mg by mouth daily   Yes Historical Provider, MD   methocarbamol (ROBAXIN) 750 MG tablet Take 750 mg by mouth daily as needed   Yes Historical Provider, MD   omeprazole (PRILOSEC) 20 MG delayed release capsule Take 20 mg by mouth daily   Yes Historical Provider, MD   ondansetron (ZOFRAN) 4 MG tablet Take 4 mg by mouth every 8 hours as needed for Nausea or Vomiting   Yes Historical Provider, MD   potassium chloride (KLOR-CON M) 20 MEQ extended release tablet Take 20 mEq by mouth daily   Yes Historical Provider, MD   senna-docusate (PERICOLACE) 8.6-50 MG per tablet Take 2 tablets by mouth daily   Yes Historical Provider, MD   clonazePAM (KLONOPIN) 1 MG tablet Take 1 mg by mouth 2 times daily.    Yes Historical Provider, MD   linaclotide (LINZESS) 290 MCG CAPS capsule Take 290 mcg by mouth every morning (before breakfast)   Yes Historical Provider, MD   rOPINIRole (REQUIP) 0.5 MG tablet Take 0.5 mg by mouth 3 times daily   Yes Historical Provider, MD   levothyroxine (SYNTHROID) 75 MCG tablet Take 37.5 mcg by mouth Daily 4/2/22   Historical Provider, MD Allergies:       Penicillins, Ertapenem, Naproxen, and Valproic acid    Social History:     Tobacco:    reports that she has been smoking. She has been smoking an average of .25 packs per day. She has never used smokeless tobacco.  Alcohol:      has no history on file for alcohol use. Drug Use: has no history on file for drug use. Family History:     History reviewed. No pertinent family history. Review of Systems:     Review of Systems   Constitutional:  Positive for activity change, fatigue and unexpected weight change. HENT:  Positive for trouble swallowing. Gastrointestinal:  Positive for abdominal pain, constipation, diarrhea and nausea. Musculoskeletal:  Positive for arthralgias, gait problem and myalgias. Neurological:  Positive for weakness. Psychiatric/Behavioral:  The patient is nervous/anxious. Code Status:  Full Code    Physical Exam:     Vitals:  BP (!) 103/55   Pulse 77   Temp 97.6 °F (36.4 °C) (Oral)   Resp 14   Ht 4' 11\" (1.499 m)   Wt 104 lb 8 oz (47.4 kg)   SpO2 96%   BMI 21.11 kg/m²   Temp (24hrs), Av.8 °F (36.6 °C), Min:97.6 °F (36.4 °C), Max:97.9 °F (36.6 °C)      Physical Exam  Vitals and nursing note reviewed. Constitutional:       Appearance: She is well-developed. Comments: Patient appears to be frail. May have signs of mild malnutrition. She appears to be older than stated age. HENT:      Head: Normocephalic and atraumatic. Eyes:      General: No scleral icterus. Conjunctiva/sclera: Conjunctivae normal.      Pupils: Pupils are equal, round, and reactive to light. Neck:      Thyroid: No thyromegaly. Vascular: No hepatojugular reflux or JVD. Trachea: No tracheal deviation. Cardiovascular:      Rate and Rhythm: Normal rate and regular rhythm. Heart sounds: Normal heart sounds. Pulmonary:      Effort: Pulmonary effort is normal. No respiratory distress. Breath sounds: Normal breath sounds. No wheezing or rales. Abdominal:      General: Bowel sounds are normal. There is no distension. Palpations: Abdomen is soft. There is no hepatomegaly or mass. Tenderness: There is no abdominal tenderness. There is no rebound. Hernia: No hernia is present. Musculoskeletal:         General: No tenderness. Cervical back: Normal range of motion and neck supple. Comments: No joint swelling patient is alert able to ambulate because of Parkinson's disease. Lymphadenopathy:      Cervical: No cervical adenopathy. Skin:     General: Skin is warm. Findings: No bruising, ecchymosis, erythema or rash. Neurological:      Mental Status: She is alert and oriented to person, place, and time. Cranial Nerves: No cranial nerve deficit. Comments: Has tremors consistent with history of Parkinson's disease   Psychiatric:         Thought Content:  Thought content normal.     Data:   CBC:   Lab Results   Component Value Date/Time    WBC 5.5 01/25/2023 03:47 AM    RBC 3.48 01/25/2023 03:47 AM    RBC 3.54 09/06/2011 05:00 AM    HGB 11.0 01/25/2023 03:47 AM    HCT 33.6 01/25/2023 03:47 AM    MCV 96.5 01/25/2023 03:47 AM    MCH 31.5 01/25/2023 03:47 AM    MCHC 32.6 01/25/2023 03:47 AM    RDW 13.5 01/25/2023 03:47 AM     01/25/2023 03:47 AM     09/06/2011 05:00 AM    MPV 9.0 01/25/2023 03:47 AM     CBC with Differential:  Lab Results   Component Value Date/Time    WBC 5.5 01/25/2023 03:47 AM    RBC 3.48 01/25/2023 03:47 AM    RBC 3.54 09/06/2011 05:00 AM    HGB 11.0 01/25/2023 03:47 AM    HCT 33.6 01/25/2023 03:47 AM     01/25/2023 03:47 AM     09/06/2011 05:00 AM    MCV 96.5 01/25/2023 03:47 AM    MCH 31.5 01/25/2023 03:47 AM    MCHC 32.6 01/25/2023 03:47 AM    RDW 13.5 01/25/2023 03:47 AM    LYMPHOPCT 30 01/25/2023 03:47 AM    MONOPCT 8 01/25/2023 03:47 AM    BASOPCT 1 01/25/2023 03:47 AM    MONOSABS 0.40 01/25/2023 03:47 AM    LYMPHSABS 1.70 01/25/2023 03:47 AM    EOSABS 0.20 01/25/2023 03:47 AM    BASOSABS 0.10 01/25/2023 03:47 AM    DIFFTYPE NOT REPORTED 06/14/2021 07:07 PM     Hemoglobin/Hematocrit:  Lab Results   Component Value Date/Time    HGB 11.0 01/25/2023 03:47 AM    HCT 33.6 01/25/2023 03:47 AM     CMP:    Lab Results   Component Value Date/Time     01/25/2023 03:47 AM    K 3.6 01/25/2023 03:47 AM     01/25/2023 03:47 AM    CO2 19 01/25/2023 03:47 AM    BUN 6 01/25/2023 03:47 AM    CREATININE <0.40 01/25/2023 03:47 AM    GFRAA CANNOT BE CALCULATED 06/14/2021 07:07 PM    LABGLOM Can not be calculated 01/25/2023 03:47 AM    GLUCOSE 87 01/25/2023 03:47 AM    GLUCOSE 121 09/06/2011 05:00 AM    PROT 5.9 01/23/2023 09:27 PM    LABALBU 3.8 01/23/2023 09:27 PM    CALCIUM 7.5 01/25/2023 03:47 AM    BILITOT 0.2 01/23/2023 09:27 PM    ALKPHOS 55 01/23/2023 09:27 PM    AST 15 01/23/2023 09:27 PM    ALT <5 01/23/2023 09:27 PM     BMP:  Lab Results   Component Value Date/Time     01/25/2023 03:47 AM    K 3.6 01/25/2023 03:47 AM     01/25/2023 03:47 AM    CO2 19 01/25/2023 03:47 AM    BUN 6 01/25/2023 03:47 AM    LABALBU 3.8 01/23/2023 09:27 PM    CREATININE <0.40 01/25/2023 03:47 AM    CALCIUM 7.5 01/25/2023 03:47 AM    GFRAA CANNOT BE CALCULATED 06/14/2021 07:07 PM    LABGLOM Can not be calculated 01/25/2023 03:47 AM    GLUCOSE 87 01/25/2023 03:47 AM    GLUCOSE 121 09/06/2011 05:00 AM     PT/INR:    Lab Results   Component Value Date/Time    PROTIME 13.1 01/23/2023 09:27 PM    INR 1.0 01/23/2023 09:27 PM     PTT:    Lab Results   Component Value Date/Time    APTT 30.6 06/14/2021 07:07 PM   [APTT}    Assesment:     Primary Problem  Colitis    Active Hospital Problems    Diagnosis Date Noted    Hypovolemia [E86.1] 01/24/2023     Priority: Medium    History of seizures [Z87.898] 01/24/2023     Priority: Medium    Hypothyroidism [E03.9] 01/24/2023     Priority: Medium    Colitis [K52.9] 01/23/2023     Priority: Medium    Septic shock ([facility] [address] [A41.9, R65.21] 01/23/2023     Priority: Medium       Plan: At present patient is stable. Abdominal examination benign. Active bowel sounds. Not clear regarding CT findings of colitis. Suspicious for false positive diagnosis. Will check the colonoscopy report from about 2 months ago. We will keep her on dysphagia diet. At present patient does not look toxic and her labs are within normal limits. Patient may need help for feeding because of Parkinson's disease. Thank you for allowing me to participate in the care of your patient. Please feel free to contact me with anyquestions or concerns. Electronically signed by Soumya Max MD on 1/25/2023 at 8:48 AM     Copy sent to Dr. Norris primary care provider on file. Note is dictated utilizing voice recognition software. Unfortunately this leads to occasional typographical errors. Please contact our office if you have any questions.

## 2023-01-25 NOTE — PROGRESS NOTES
01/25/23 1430   Encounter Summary   Encounter Overview/Reason  Attempted Encounter   Service Provided For: Patient   Assessment/Intervention/Outcome   Assessment Unable to assess   Intervention Prayer (assurance of)/[place]   Outcome   (Patient sleeping)

## 2023-01-25 NOTE — PROGRESS NOTES
[facility] -   Infectious diseases evaluation  admission date 1/23/2023    reason for consultation:   Shock    Impression :   Current:  E. coli UTI, pending sensitivity  Shock   Possible colitis, stool studies pending  Possible intrahepatic biliary ductal dilatation on CT abdomen/unremarkable LFTs on 1/23/2023. Parkinson's disease  History of seizure  Bipolar disorder  Penicillin/ertapenem allergy    Recommendations   IV ceftriaxone and Flagyl  Follow blood and urine cultures  Gallbladder ultrasound 1/24/2023 unremarkable  Stool for C. difficile and GI panel pending/had no stool  Discussed with nursing staff  Continue supportive care    Infection Control Recommendations   [place] Precautions      Antimicrobial Stewardship Recommendations   Simplification of therapy  Targeted therapy      History of Present Illness:   Initial history:  Jacqueline Wilson is a 63y.o.-year-old female was brought to the hospital via EMS from a nursing facility for generalized weakness associated with nausea, diarrhea and vomiting for around 3 weeks prior to admission. Symptoms moderate to severe, no alleviating or aggravating factors . The patient was found to be hypotensive by EMS with reported systolic blood pressure in the 60s. She received Fluid resuscitation and was started on Levophed. The patient is poor historian, states that she had history of colitis, had no bowel movement since arrival to the ICU, stool studies not sent. Initial lactic acid was 4.3 then 2.4. Initial WBC 4.5, hemoglobin 12.7, creatinine 0.41, procalcitonin 0.03  Chest x-ray showed left helium fullness. CT chest abdomen and pelvis suggestive of colitis, minimal intrahepatic biliary ductal prominence, no left hilar mass, L2-L3 compression deformities.   Ethanol level was 17  Drug screen was positive for opiates and oxycodone  Urinalysis showed many bacteria, negative nitrate, negative leukocyte esterase, 0-2 WBC  Interval changes  1/25/2023   She is off Levophed, complaining of abdominal discomfort, no diarrhea today, no new complaints. Urine culture grew E. coli  Patient Vitals for the past 8 hrs:   BP Temp Temp src Pulse Resp SpO2   01/25/23 1800 127/61 98.5 °F (36.9 °C) Oral 58 20 98 %   01/25/23 1745 (!) 124/108 -- -- 96 21 (!) 89 %   01/25/23 1730 132/71 -- -- 65 25 94 %   01/25/23 1715 119/62 -- -- 64 11 93 %   01/25/23 1700 119/78 -- -- 64 18 99 %   01/25/23 1645 (!) 121/55 -- -- 58 18 99 %   01/25/23 1630 109/62 -- -- 62 18 98 %   01/25/23 1615 (!) 123/55 -- -- 60 25 97 %   01/25/23 1600 (!) 140/85 98.5 °F (36.9 °C) -- 65 28 96 %   01/25/23 1545 126/67 -- -- 59 25 97 %   01/25/23 1530 120/67 -- -- 60 26 96 %   01/25/23 1515 131/71 -- -- 62 28 99 %   01/25/23 1500 128/71 -- -- 59 27 98 %   01/25/23 1445 137/67 -- -- 62 28 95 %   01/25/23 1430 (!) 104/53 -- -- 55 26 96 %   01/25/23 1415 (!) 111/50 -- -- 59 20 97 %   01/25/23 1400 (!) 111/51 -- -- 66 (!) 31 96 %   01/25/23 1345 94/64 -- -- 67 21 94 %   01/25/23 1330 132/73 -- -- 53 25 99 %   01/25/23 1315 134/69 -- -- 56 25 97 %   01/25/23 1300 (!) 124/55 -- -- 69 19 99 %   01/25/23 1245 135/79 -- -- 58 27 98 %   01/25/23 1230 (!) 140/70 -- -- 60 17 (!) 85 %   01/25/23 1215 127/76 -- -- 60 15 93 %   01/25/23 1200 124/74 98.7 °F (37.1 °C) Oral 73 19 90 %   01/25/23 1145 (!) 140/113 -- -- 78 26 100 %   01/25/23 1130 139/70 -- -- 59 25 99 %   01/25/23 1115 (!) 98/42 -- -- 58 24 98 %   01/25/23 1100 134/68 -- -- 55 21 99 %           I have personally reviewed the past medical history, past surgical history, medications, social history, and family history, and I haveupdated the database accordingly. Allergies:   Penicillins, Ertapenem, Naproxen, and Valproic acid     Review of Systems:     Review of Systems  As per history of present illness, other than above 12 system review was negative.   Physical Examination :       Physical Exam  Constitutional:       General: She is not in acute distress. HENT:      Head: Normocephalic and atraumatic. Right Ear: External ear normal.      Left Ear: External ear normal.   Eyes:      General: No scleral icterus. Conjunctiva/sclera: Conjunctivae normal.   Cardiovascular:      Rate and Rhythm: Normal rate and regular rhythm. Heart sounds: No murmur heard. Pulmonary:      Effort: Pulmonary effort is normal. No respiratory distress. Abdominal:      Palpations: Abdomen is soft. Tenderness: There is abdominal tenderness. There is no right CVA tenderness, left CVA tenderness, guarding or rebound. Musculoskeletal:         General: No swelling. Cervical back: Neck supple. No rigidity. Right lower leg: No edema. Left lower leg: No edema. Skin:     Coloration: Skin is not jaundiced. Findings: No bruising. Neurological:      General: No focal deficit present. Mental Status: She is alert. Past Medical History:   History reviewed. No pertinent past medical history. Past Surgical  History:   History reviewed. No pertinent surgical history.     Medications:      midodrine  10 mg Oral TID WC    sodium chloride flush  5-40 mL IntraVENous 2 times per day    enoxaparin  30 mg SubCUTAneous Daily    metroNIDAZOLE  500 mg IntraVENous Q8H    pantoprazole (PROTONIX) 40 mg injection  40 mg IntraVENous Daily    cefTRIAXone (ROCEPHIN) IV  1,000 mg IntraVENous Q24H    levothyroxine  37.5 mcg Oral Daily    benztropine  2 mg Oral BID    rOPINIRole  0.5 mg Oral TID       Social History:     Social History     Socioeconomic History    Marital status:      Spouse name: Not on file    Number of children: Not on file    Years of education: Not on file    Highest education level: Not on file   Occupational History    Not on file   Tobacco Use    Smoking status: Every Day     Packs/day: 0.25     Types: Cigarettes    Smokeless tobacco: Never   Substance and Sexual Activity    Alcohol use: Not on file    Drug use: Not on file    Sexual activity: Not on file   Other Topics Concern    Not on file   Social History Narrative    Not on file     Social Determinants of Health     Financial Resource Strain: Not on file   Food Insecurity: Not on file   Transportation Needs: Not on file   Physical Activity: Not on file   Stress: Not on file   Social Connections: Not on file   Intimate Partner Violence: Not on file   Housing Stability: Not on file       Family History:   History reviewed. No pertinent family history. Medical Decision Making:   I have independently reviewed/ordered the following labs:    CBC with Differential:   Recent Labs     01/24/23  0813 01/25/23 0347   WBC 5.6 5.5   HGB 12.7 11.0*   HCT 37.7 33.6*    242   LYMPHOPCT 26 30   MONOPCT 8* 8*       BMP:  Recent Labs     01/24/23  0813 01/25/23 0347    141   K 4.9 3.6*   * 115*   CO2 21 19*   BUN 11 6*   CREATININE 0.46* <0.40*       Hepatic Function Panel:   Recent Labs     01/23/23 2127   PROT 5.9*   LABALBU 3.8   BILITOT 0.2*   ALKPHOS 55   ALT <5*   AST 15       No results for input(s): RPR in the last 72 hours. No results for input(s): HIV in the last 72 hours. No results for input(s): BC in the last 72 hours. Lab Results   Component Value Date/Time    CREATININE <0.40 01/25/2023 03:47 AM    GLUCOSE 87 01/25/2023 03:47 AM    GLUCOSE 121 09/06/2011 05:00 AM       Detailed results: Thank you for allowing us to participate in the care of this patient. Please call with questions. This note is created with the assistance of a speech recognition program.  While intending to generate adocument that actually reflects the content of the visit, the document can still have some errors including those of syntax and sound a like substitutions which may escape proof reading. It such instances, actual meaningcan be extrapolated by contextual diversion.     Jordan Duvall MD  Office: [phone]  [employer] / office [phone]

## 2023-01-25 NOTE — PROGRESS NOTES
Patient transferred to room 2096 with all hr belongings. No needs at this time. Bed in lowest position.  RN aware

## 2023-01-25 NOTE — PROGRESS NOTES
RN asked pt if she would like to get washed up, CHG bath and RN explained due to her central line and [place] to help prevent further infection. Pt requests in the  morning. RN stated she has time now, pt would like it in the morning she said.

## 2023-01-25 NOTE — PROGRESS NOTES
ICU Progress Note (Non-Vent)  [facility]    Patient - Beckie Proctor,  Age - 63 y.o.    - 1960      Room Number -    MRN -  [id number]   [facility]  Date of Admission -  2023  9:13 PM    Events of Past 24 Hours   Was on norepinephrine again overnight, just stopped at 5:30 in the morning blood pressure now seems to be more reasonable    Vitals    height is 4' 11\" (1.499 m) and weight is 104 lb 8 oz (47.4 kg). Her oral temperature is 98.3 °F (36.8 °C). Her blood pressure is 124/60 and her pulse is 62. Her respiration is 21 and oxygen saturation is 97%. Temperature Range: Temp: 98.3 °F (36.8 °C) Temp  Av.9 °F (36.6 °C)  Min: 97.6 °F (36.4 °C)  Max: 98.3 °F (36.8 °C)  BP Range:  Systolic (89EIP), OCP:891 , Min:75 , SQF:831     Diastolic (67CAY), IRX:93, Min:34, Max:107    Pulse Range: Pulse  Av.5  Min: 60  Max: 85  Respiration Range: Resp  Av.9  Min: 8  Max: 28  Current Pulse Ox[de-identified]  SpO2: 97 %  24HR Pulse Ox Range:  SpO2  Av.5 %  Min: 89 %  Max: 100 %  Oxygen Amount and Delivery:       Wt Readings from Last 3 Encounters:   23 104 lb 8 oz (47.4 kg)   22 75 lb (34 kg)   22 100 lb (45.4 kg)     I/O     Intake/Output Summary (Last 24 hours) at 2023 0924  Last data filed at 2023 3480  Gross per 24 hour   Intake --   Output 2800 ml   Net -2800 ml     DRAIN/TUBE OUTPUT       Invasive Lines   ICP PRESSURE RANGE  No data recorded  CVP PRESSURE RANGE  No data recorded      Medications      midodrine  10 mg Oral TID WC    potassium chloride  40 mEq Oral Once    sodium chloride flush  5-40 mL IntraVENous 2 times per day    enoxaparin  30 mg SubCUTAneous Daily    metroNIDAZOLE  500 mg IntraVENous Q8H    pantoprazole (PROTONIX) 40 mg injection  40 mg IntraVENous Daily    cefTRIAXone (ROCEPHIN) IV  1,000 mg IntraVENous Q24H    levothyroxine  37.5 mcg Oral Daily    benztropine  2 mg Oral BID    rOPINIRole  0.5 mg Oral TID     sodium chloride flush, sodium chloride, ondansetron **OR** ondansetron, magnesium hydroxide, acetaminophen **OR** acetaminophen, potassium chloride, fentanNYL, droperidol, sodium chloride flush, glucose, dextrose bolus **OR** dextrose bolus, glucagon (rDNA), dextrose  IV Drips/Infusions   sodium chloride      sodium chloride 150 mL/hr at 01/25/23 0308    norepinephrine Stopped (01/25/23 0532)    dextrose         Diet/Nutrition   ADULT DIET; Dysphagia - Soft and Bite Sized    Exam      Constitutional - Alert, arousable  General Appearance thin, ill-appearing  HEENT -normocephalic, atraumatic. PERRLA  Lungs - Chest expands equally, no wheezes, rales or rhonchi. Cardiovascular - Heart sounds are normal.  normal rate and rhythm regular, no murmur, gallop or rub. Abdomen - soft, nontender, nondistended, no masses or organomegaly  Neurologic - CN II-XII are grossly intact.  There are no focal motor deficits  Skin - no bruising or bleeding  Extremities - no cyanosis, clubbing or edema    Lab Results   CBC     Lab Results   Component Value Date/Time    WBC 5.5 01/25/2023 03:47 AM    RBC 3.48 01/25/2023 03:47 AM    RBC 3.54 09/06/2011 05:00 AM    HGB 11.0 01/25/2023 03:47 AM    HCT 33.6 01/25/2023 03:47 AM     01/25/2023 03:47 AM     09/06/2011 05:00 AM    MCV 96.5 01/25/2023 03:47 AM    MCH 31.5 01/25/2023 03:47 AM    MCHC 32.6 01/25/2023 03:47 AM    RDW 13.5 01/25/2023 03:47 AM    LYMPHOPCT 30 01/25/2023 03:47 AM    MONOPCT 8 01/25/2023 03:47 AM    BASOPCT 1 01/25/2023 03:47 AM    MONOSABS 0.40 01/25/2023 03:47 AM    LYMPHSABS 1.70 01/25/2023 03:47 AM    EOSABS 0.20 01/25/2023 03:47 AM    BASOSABS 0.10 01/25/2023 03:47 AM    DIFFTYPE NOT REPORTED 06/14/2021 07:07 PM       BMP   Lab Results   Component Value Date/Time     01/25/2023 03:47 AM    K 3.6 01/25/2023 03:47 AM     01/25/2023 03:47 AM    CO2 19 01/25/2023 03:47 AM    BUN 6 01/25/2023 03:47 AM    CREATININE <0.40 01/25/2023 03:47 AM    GLUCOSE 87 01/25/2023 03:47 AM    GLUCOSE 121 09/06/2011 05:00 AM       LFTS  Lab Results   Component Value Date/Time    ALKPHOS 55 01/23/2023 09:27 PM    ALT <5 01/23/2023 09:27 PM    AST 15 01/23/2023 09:27 PM    PROT 5.9 01/23/2023 09:27 PM    BILITOT 0.2 01/23/2023 09:27 PM    LABALBU 3.8 01/23/2023 09:27 PM       ABG ABGs:   Lab Results   Component Value Date/Time    PHART 7.363 06/14/2021 07:13 PM    PO2ART 352.0 06/14/2021 07:13 PM    EZZ8IGK 39.7 06/14/2021 07:13 PM       Lab Results   Component Value Date/Time    MODE PRVC 06/14/2021 07:13 PM         INR  Recent Labs     01/23/23 2127   PROTIME 13.1   INR 1.0       APTT  No results for input(s): APTT in the last 72 hours. Lactic Acid  Lab Results   Component Value Date/Time    LACTA 0.9 01/24/2023 11:13 AM    LACTA 1.3 01/24/2023 05:09 AM    LACTA 2.7 06/14/2021 09:31 PM        BNP   No results for input(s): BNP in the last 72 hours. Cultures       Radiology     CXR      CT Scans    (See actual reports for details)      SYSTEMS ASSESSMENT    SIRS with hypotension  Lactic acidosis-resolved  Hypovolemia  History of GERD  Hypothyroidism  History of Parkinson's disease  History of seizure disorder-not on any medications  Central lobar emphysema versus cystic lesions      Continue IV fluids but switch over to lactated Ringer's for more balance solution, decrease rate to 100 an hour  Replace potassium  Hold off on transfer out of ICU since she just got off norepinephrine, watch at least 12 hours  Midodrine added  Continue ceftriaxone and Flagyl  Interestingly, she is not having any more diarrhea but complains of abdominal pain  Records from [facility] show no evidence of ulcerative colitis on recent colonoscopy.   Also had an EGD which showed GERD    Critical Care Time   0 min    Electronically signed by Avani Pedraza MD on 1/25/2023 at 9:24 AM

## 2023-01-25 NOTE — PROGRESS NOTES
[address]    PROGRESS NOTE             1/25/2023    7:56 AM    Name:   Marlin Valentine  MRN:     [id number]     Acct:      [de-identified]   Room:   2002/2002-01  IP Day:  2  Admit Date:  1/23/2023  9:13 PM    PCP:  No primary care provider on file. Code Status:  Full Code    Subjective:     C/C:   Chief Complaint   Patient presents with    Fatigue    Other     Hypotension     Interval History Status: not changed. Seen and examined at bedside. Continues to report diffuse abdominal pain more severe lower part of abdomen. Started on a clear liquid diet yesterday. No current nausea or vomiting. Has not had a bowel admission. Vitals remained stable off Levophed. Denies chest pain, shortness of breath. She is afebrile. Brief History:     The patient is a 63 y.o. Non- / non  female who presents withFatigue and Other (Hypotension)   and she is admitted to the hospital for the management of septic shock and colitis. Past medical history includes bipolar type I disorder, Parkinson's disease, chronic colitis, seizure disorder, Paget's disease, hypothyroidism. Patient states that she has been having abdominal pain, diarrhea, nausea and vomiting for the last 3 to 4 weeks. This vomit has been yellow and nonbloody. The diarrhea she reports having 1 recent bowel movement with blood in it. Yesterday her roommate noticed that she looked very sick and asked the facility to call EMS. On arrival, EMS found her to have a blood pressure in the 60s over 30s.  2 L of IV fluid given in route to the hospital.  Eventually required a right femoral central line due to septic shock requiring Levophed. CT chest abdomen pelvis showed colitis of the ascending colon, unremarkable chest.  Lactic acid of 4.3, most recent of 2.4 this morning. UA showed many bacteria. Urine culture in progress. Blood cultures show no growth thus far.  Urine tox screen in the ED was positive for barbiturates and opiates. Patient started on IV Vanco cefepime and Flagyl. Remains on Levophed. She is afebrile currently. Although reports having a fever over last few days. Review of Systems:     Review of Systems   Constitutional: Negative. Negative for activity change, appetite change, chills, fatigue and fever. HENT: Negative. Negative for congestion. Eyes: Negative. Respiratory: Negative. Negative for cough, chest tightness, shortness of breath and wheezing. Cardiovascular: Negative. Negative for chest pain, palpitations and leg swelling. Gastrointestinal:  Positive for abdominal pain. Negative for abdominal distention, blood in stool, constipation, diarrhea, nausea and vomiting. Genitourinary: Negative. Musculoskeletal:  Positive for back pain. Negative for arthralgias, gait problem, joint swelling, myalgias, neck pain and neck stiffness. Skin: Negative. Negative for color change, pallor, rash and wound. Neurological: Negative. Negative for dizziness, seizures, weakness, numbness and headaches. Psychiatric/Behavioral: Negative. The patient is not nervous/anxious. Medications: Allergies:     Allergies   Allergen Reactions    Penicillins Shortness Of Breath     Throat swelling    Ertapenem      invanz    Naproxen      History of bleeding ulcer    Valproic Acid Other (See Comments)     Raised ammonia levels       Current Meds:   Scheduled Meds:    midodrine  10 mg Oral TID WC    potassium chloride  40 mEq Oral Once    sodium chloride flush  5-40 mL IntraVENous 2 times per day    enoxaparin  30 mg SubCUTAneous Daily    metroNIDAZOLE  500 mg IntraVENous Q8H    pantoprazole (PROTONIX) 40 mg injection  40 mg IntraVENous Daily    cefTRIAXone (ROCEPHIN) IV  1,000 mg IntraVENous Q24H    levothyroxine  37.5 mcg Oral Daily    benztropine  2 mg Oral BID    rOPINIRole  0.5 mg Oral TID     Continuous Infusions:    sodium chloride sodium chloride 150 mL/hr at 23 0308    norepinephrine Stopped (23 0532)    dextrose       PRN Meds: sodium chloride flush, sodium chloride, ondansetron **OR** ondansetron, magnesium hydroxide, acetaminophen **OR** acetaminophen, potassium chloride, fentanNYL, droperidol, sodium chloride flush, glucose, dextrose bolus **OR** dextrose bolus, glucagon (rDNA), dextrose    Data:     Past Medical History:   has no past medical history on file. Social History:   reports that she has been smoking. She has been smoking an average of .25 packs per day. She has never used smokeless tobacco.     Family History: History reviewed. No pertinent family history. Vitals:  BP (!) 103/55   Pulse 77   Temp 97.6 °F (36.4 °C) (Oral)   Resp 14   Ht 4' 11\" (1.499 m)   Wt 104 lb 8 oz (47.4 kg)   SpO2 96%   BMI 21.11 kg/m²   Temp (24hrs), Av.8 °F (36.6 °C), Min:97.6 °F (36.4 °C), Max:97.9 °F (36.6 °C)    Recent Labs     23  0052 23  0130 23  0318   POCGLU 74 274* 139*       I/O(24Hr): Intake/Output Summary (Last 24 hours) at 2023 0756  Last data filed at 2023 2271  Gross per 24 hour   Intake --   Output 3800 ml   Net -3800 ml       Labs:  [unfilled]    Lab Results   Component Value Date/Time    SPECIAL LAC 10CC EACH 2023 10:00 PM     Lab Results   Component Value Date/Time    CULTURE NO GROWTH 12 HOURS 2023 10:00 PM       [unfilled]    Radiology:    Vega Romero RUQUETA    Result Date: 2023  EXAMINATION: RIGHT UPPER QUADRANT ULTRASOUND 2023 1:25 pm COMPARISON: None. HISTORY: ORDERING SYSTEM PROVIDED HISTORY: Rule out gallbladder pathology TECHNOLOGIST PROVIDED HISTORY: Rule out gallbladder pathology FINDINGS: LIVER:  The liver demonstrates normal echogenicity without evidence of intrahepatic biliary ductal dilatation. Hepatopetal flow portal vein. Liver 13.9 cm in length.  BILIARY SYSTEM:  Gallbladder is unremarkable without evidence of pericholecystic fluid, wall thickening or stones.  Negative sonographic Walsh's sign. Common bile duct is within normal limits measuring 3.8 mm. RIGHT KIDNEY: The right kidney is grossly unremarkable without evidence of hydronephrosis. PANCREAS:  Visualized portions of the pancreas are unremarkable. OTHER: No evidence of right upper quadrant ascites.     Unremarkable right upper quadrant ultrasound.     XR CHEST PORTABLE    Result Date: 1/23/2023  EXAMINATION: ONE XRAY VIEW OF THE CHEST 1/23/2023 9:29 pm COMPARISON: None. HISTORY: ORDERING SYSTEM PROVIDED HISTORY: chest pain TECHNOLOGIST PROVIDED HISTORY: chest pain Reason for Exam: Chest pain, took best image per pt condition FINDINGS: The cardiomediastinal silhouette is normal in size.  There is prominence/fullness of the left hilar region, nonspecific.  No focal airspace disease.  No pleural effusion or pneumothorax.  No evidence of acute osseous abnormality.     1.  Nonspecific fullness/prominence of the left hilum likely secondary to vessels and lymph nodes, although an underlying lesion is not excluded. Consider CT chest with contrast for further evaluation. 2.  No focal airspace disease.     CT CHEST ABDOMEN PELVIS W CONTRAST Additional Contrast? None    Result Date: 1/23/2023  EXAMINATION: CT OF THE CHEST, ABDOMEN, AND PELVIS WITH CONTRAST 1/23/2023 10:01 pm TECHNIQUE: CT of the chest, abdomen and pelvis was performed with the administration of intravenous contrast. Multiplanar reformatted images are provided for review. Automated exposure control, iterative reconstruction, and/or weight based adjustment of the mA/kV was utilized to reduce the radiation dose to as low as reasonably achievable. COMPARISON: None HISTORY: ORDERING SYSTEM PROVIDED HISTORY: abdominal pain TECHNOLOGIST PROVIDED HISTORY: abdominal pain Reason for Exam: abdominal pain, abnormal chest xray Additional signs and symptoms: hip surgeries FINDINGS: Chest: Chest Wall and Thoracic Inlet: No axillary or  supraclavicular lymphadenopathy. The thyroid gland is unremarkable. Mediastinum and Luisa: No mediastinal or hilar lymphadenopathy. Normal caliber of the thoracic aorta. Borderline dilation of the main pulmonary artery measuring up to 3.2 cm. No central pulmonary embolism. Borderline cardiomegaly. No pericardial effusion. Lungs/Pleura: Mild centrilobular emphysema. Dependent hypoventilatory changes of the lungs. Bilateral lower lobe bronchiectasis. No evidence of left hilar mass. No pleural effusion. Bones: No suspicious osseous findings. Remote right lateral 5th-7th rib fractures. Subacute to remote right lateral 9th rib fracture. Abdomen/Pelvis: Organs: The liver is unremarkable. Mild intrahepatic biliary ductal prominence. The common bile duct is within normal limits in caliber. The gallbladder is unremarkable. The spleen, pancreas, and adrenal glands are unremarkable. Left upper pole renal cyst demonstrates hairline thin internal septations. No hydronephrosis or renal calculi. GI/Bowel: No evidence of bowel obstruction. There is mucosal hyperenhancement and wall thickening of the ascending colon compatible with colitis. Mild surrounding inflammatory stranding. Pelvis: Distended bladder. Retroverted uterus with an associated exophytic anterior uterine fibroid. Peritoneum/Retroperitoneum: No retroperitoneal, mesenteric, or pelvic lymphadenopathy. No free fluid or free intraperitoneal gas. The abdominal aorta demonstrates scattered atherosclerosis without aneurysm. Multiple lucent centered venous phleboliths are seen along the right pelvic sidewall. Bones/Soft Tissues: Age-indeterminate compression deformities of the L2 and L3 vertebral bodies. Moderate to severe degenerative disc disease at the L4-L5 level. 1. Mucosal hyperenhancement, moderate wall thickening, and surrounding inflammatory stranding of the ascending colon compatible with colitis.  2.  Minimal intrahepatic biliary ductal prominence without common bile duct dilation. Consider correlation with  LFTs if clinically indicated. 3.  No evidence of left hilar mass. Hilar prominence noted on same day chest radiograph corresponds to prominent hilar vessels. 4.  Age-indeterminate compression deformities of the L2 and L3 vertebral bodies. Correlate with clinical symptoms. Physical Examination:        Physical Exam  Constitutional:       Appearance: Normal appearance. HENT:      Head: Normocephalic and atraumatic. Mouth/Throat:      Mouth: Mucous membranes are dry. Eyes:      Extraocular Movements: Extraocular movements intact. Conjunctiva/sclera: Conjunctivae normal.      Pupils: Pupils are equal, round, and reactive to light. Cardiovascular:      Rate and Rhythm: Normal rate and regular rhythm. Pulses: Normal pulses. Heart sounds: Normal heart sounds. Pulmonary:      Effort: Pulmonary effort is normal.      Breath sounds: Normal breath sounds. Abdominal:      General: Abdomen is flat. Bowel sounds are normal.      Palpations: Abdomen is soft. Comments: Tenderness on palpation throughout the entirety abdomen. More severe in the lower third of the abdomen. Walsh sign negative   Musculoskeletal:         General: No swelling or tenderness. Normal range of motion. Cervical back: Normal range of motion and neck supple. Skin:     General: Skin is warm and dry. Neurological:      General: No focal deficit present. Mental Status: She is alert and oriented to person, place, and time. Mental status is at baseline.      Assessment:        Primary Problem  Colitis    Active Hospital Problems    Diagnosis Date Noted    Hypovolemia [E86.1] 01/24/2023     Priority: Medium    History of seizures [Z87.898] 01/24/2023     Priority: Medium    Hypothyroidism [E03.9] 01/24/2023     Priority: Medium    Colitis [K52.9] 01/23/2023     Priority: Medium    Septic shock ([facility] [address] [A41.9, R65.21] 01/23/2023 Priority: Medium       Plan:        Septic shock likely secondary to colitis in the setting of hypovolemia  - Continues to have abdominal pain. No bowel movement since admission  - Occult blood pending  - Off Levophed. Midodrine 10 mg 3 times daily started  - IV cefepime and Flagyl  - Vancomycin discontinued yesterday  - Blood culture negative thus far  - Lactic acid trending down. Within normal limits  - White blood cell count within normal limits, procalcitonin within normal limits  - CT shows ascending colitis and possible CBD dilation  - Ultrasound gallbladder unremarkable  - GI panel pending. C. difficile pending  - Aggressive IV fluid hydration 150 mL/h  - General surgery following. No acute surgical intervention at this time. Clear liquid diet started  - GI consult  - ID consult    Bipolar 1 disorder  - Currently resides at inpatient psychiatric rehabilitation in [place]  - Restarted home med benzatropine 2 mg twice daily    Parkinson's disease  - Per med rec, no record of current medication for Parkinson's disease  -Restart home med Requip 0.5 mg 3 times daily for restless legs     History of seizures  - Admitted to [facility] in June 2021 due to seizures  - Not currently on any at home seizure medication per chart review    Hypokalemia  - Potassium 3.6 this morning  - Replacement protocol in place     Hypocalcemia  - Calcium of 7.5  - Ionized calcium of 1.14 within normal limits     Hypothyroidism  - Not currently on Synthroid at home  - TSH of 0.53     DVT prophylaxis: Lovenox 30 mg subcu daily  GI prophylaxis: Protonix 40 mg IV daily  Diet: Clear liquids  Full code  PT/OT/social work    Misael Tate MD  1/25/2023  7:56 AM      Attending Physician Statement  I have discussed the care of Ita Gutiérrez and I have examined the patient myselft and taken ros and hpi , including pertinent history and exam findings,  with the resident.  I have reviewed the key elements of all parts of the encounter with the resident. I agree with the assessment, plan and orders as documented by the resident.   Patient, clinically doing much better today  Abdominal pain is still there, diarrhea improved  Patient, off pressors  UA concerning for UTI  On Rocephin and Flagyl  GI evaluated the patient  Likely transfer out of ICU tomorrow  Electronically signed by Elwin Aase, MD

## 2023-01-26 VITALS
TEMPERATURE: 98.7 F | BODY MASS INDEX: 21.07 KG/M2 | OXYGEN SATURATION: 97 % | RESPIRATION RATE: 16 BRPM | HEIGHT: 59 IN | SYSTOLIC BLOOD PRESSURE: 120 MMHG | DIASTOLIC BLOOD PRESSURE: 72 MMHG | HEART RATE: 67 BPM | WEIGHT: 104.5 LBS

## 2023-01-26 LAB
ABSOLUTE EOS #: 0.1 K/UL (ref 0–0.4)
ABSOLUTE LYMPH #: 1.1 K/UL (ref 1–4.8)
ABSOLUTE MONO #: 0.5 K/UL (ref 0.1–1.3)
ANION GAP SERPL CALCULATED.3IONS-SCNC: 8 MMOL/L (ref 9–17)
BASOPHILS # BLD: 1 % (ref 0–2)
BASOPHILS ABSOLUTE: 0 K/UL (ref 0–0.2)
BUN BLDV-MCNC: 8 MG/DL (ref 8–23)
C-REACTIVE PROTEIN: 15.2 MG/L (ref 0–5)
CALCIUM SERPL-MCNC: 8.9 MG/DL (ref 8.6–10.4)
CAMPYLOBACTER PCR: NORMAL
CHLORIDE BLD-SCNC: 112 MMOL/L (ref 98–107)
CO2: 24 MMOL/L (ref 20–31)
CREAT SERPL-MCNC: 0.51 MG/DL (ref 0.5–0.9)
CULTURE: ABNORMAL
DATE, STOOL #1: NORMAL
E COLI ENTEROTOXIGENIC PCR: NORMAL
EOSINOPHILS RELATIVE PERCENT: 2 % (ref 0–4)
GFR SERPL CREATININE-BSD FRML MDRD: >60 ML/MIN/1.73M2
GLUCOSE BLD-MCNC: 125 MG/DL (ref 70–99)
HCT VFR BLD CALC: 31.9 % (ref 36–46)
HEMOCCULT SP1 STL QL: NEGATIVE
HEMOGLOBIN: 10.8 G/DL (ref 12–16)
LYMPHOCYTES # BLD: 16 % (ref 24–44)
MCH RBC QN AUTO: 31.8 PG (ref 26–34)
MCHC RBC AUTO-ENTMCNC: 33.8 G/DL (ref 31–37)
MCV RBC AUTO: 94.2 FL (ref 80–100)
MONOCYTES # BLD: 7 % (ref 1–7)
PDW BLD-RTO: 13.5 % (ref 11.5–14.9)
PLATELET # BLD: 206 K/UL (ref 150–450)
PLESIOMONAS SHIGELLOIDES PCR: NORMAL
PMV BLD AUTO: 9.1 FL (ref 6–12)
POTASSIUM SERPL-SCNC: 3.6 MMOL/L (ref 3.7–5.3)
RBC # BLD: 3.39 M/UL (ref 4–5.2)
SALMONELLA PCR: NORMAL
SEG NEUTROPHILS: 74 % (ref 36–66)
SEGMENTED NEUTROPHILS ABSOLUTE COUNT: 5.2 K/UL (ref 1.3–9.1)
SHIGATOXIN GENE PCR: NORMAL
SHIGELLA SP PCR: NORMAL
SODIUM BLD-SCNC: 144 MMOL/L (ref 135–144)
SPECIMEN DESCRIPTION: ABNORMAL
SPECIMEN DESCRIPTION: NORMAL
TIME, STOOL #1: NORMAL
VIBRIO PCR: NORMAL
WBC # BLD: 7 K/UL (ref 3.5–11)
YERSINIA ENTEROCOLITICA PCR: NORMAL

## 2023-01-26 PROCEDURE — 6360000002 HC RX W HCPCS: Performed by: INTERNAL MEDICINE

## 2023-01-26 PROCEDURE — 2500000003 HC RX 250 WO HCPCS: Performed by: NURSE PRACTITIONER

## 2023-01-26 PROCEDURE — 2580000003 HC RX 258: Performed by: NURSE PRACTITIONER

## 2023-01-26 PROCEDURE — 36415 COLL VENOUS BLD VENIPUNCTURE: CPT

## 2023-01-26 PROCEDURE — 99232 SBSQ HOSP IP/OBS MODERATE 35: CPT | Performed by: INTERNAL MEDICINE

## 2023-01-26 PROCEDURE — 86140 C-REACTIVE PROTEIN: CPT

## 2023-01-26 PROCEDURE — 82270 OCCULT BLOOD FECES: CPT

## 2023-01-26 PROCEDURE — 80048 BASIC METABOLIC PNL TOTAL CA: CPT

## 2023-01-26 PROCEDURE — A4216 STERILE WATER/SALINE, 10 ML: HCPCS

## 2023-01-26 PROCEDURE — 2580000003 HC RX 258

## 2023-01-26 PROCEDURE — 6360000002 HC RX W HCPCS

## 2023-01-26 PROCEDURE — 6370000000 HC RX 637 (ALT 250 FOR IP): Performed by: INTERNAL MEDICINE

## 2023-01-26 PROCEDURE — 87506 IADNA-DNA/RNA PROBE TQ 6-11: CPT

## 2023-01-26 PROCEDURE — 99239 HOSP IP/OBS DSCHRG MGMT >30: CPT | Performed by: INTERNAL MEDICINE

## 2023-01-26 PROCEDURE — 2580000003 HC RX 258: Performed by: INTERNAL MEDICINE

## 2023-01-26 PROCEDURE — 6360000002 HC RX W HCPCS: Performed by: NURSE PRACTITIONER

## 2023-01-26 PROCEDURE — 6370000000 HC RX 637 (ALT 250 FOR IP)

## 2023-01-26 PROCEDURE — 85025 COMPLETE CBC W/AUTO DIFF WBC: CPT

## 2023-01-26 PROCEDURE — C9113 INJ PANTOPRAZOLE SODIUM, VIA: HCPCS

## 2023-01-26 PROCEDURE — 99231 SBSQ HOSP IP/OBS SF/LOW 25: CPT | Performed by: INTERNAL MEDICINE

## 2023-01-26 RX ORDER — METRONIDAZOLE 500 MG/1
500 TABLET ORAL EVERY 8 HOURS SCHEDULED
Status: DISCONTINUED | OUTPATIENT
Start: 2023-01-26 | End: 2023-01-26 | Stop reason: HOSPADM

## 2023-01-26 RX ORDER — SULFAMETHOXAZOLE AND TRIMETHOPRIM 800; 160 MG/1; MG/1
1 TABLET ORAL EVERY 12 HOURS SCHEDULED
Status: DISCONTINUED | OUTPATIENT
Start: 2023-01-26 | End: 2023-01-26 | Stop reason: HOSPADM

## 2023-01-26 RX ORDER — CEFUROXIME AXETIL 500 MG/1
500 TABLET ORAL 2 TIMES DAILY
Qty: 14 TABLET | Refills: 0 | Status: SHIPPED | OUTPATIENT
Start: 2023-01-27 | End: 2023-02-03

## 2023-01-26 RX ORDER — OXYCODONE HYDROCHLORIDE AND ACETAMINOPHEN 5; 325 MG/1; MG/1
1 TABLET ORAL EVERY 4 HOURS PRN
Status: DISCONTINUED | OUTPATIENT
Start: 2023-01-26 | End: 2023-01-26 | Stop reason: HOSPADM

## 2023-01-26 RX ADMIN — ROPINIROLE HYDROCHLORIDE 0.5 MG: 1 TABLET, FILM COATED ORAL at 09:51

## 2023-01-26 RX ADMIN — SODIUM CHLORIDE, POTASSIUM CHLORIDE, SODIUM LACTATE AND CALCIUM CHLORIDE: 600; 310; 30; 20 INJECTION, SOLUTION INTRAVENOUS at 06:00

## 2023-01-26 RX ADMIN — OXYCODONE HYDROCHLORIDE AND ACETAMINOPHEN 1 TABLET: 5; 325 TABLET ORAL at 10:03

## 2023-01-26 RX ADMIN — FENTANYL CITRATE 25 MCG: 0.05 INJECTION, SOLUTION INTRAMUSCULAR; INTRAVENOUS at 02:06

## 2023-01-26 RX ADMIN — CEFTRIAXONE SODIUM 1000 MG: 1 INJECTION, POWDER, FOR SOLUTION INTRAMUSCULAR; INTRAVENOUS at 14:30

## 2023-01-26 RX ADMIN — PANTOPRAZOLE SODIUM 40 MG: 40 INJECTION, POWDER, FOR SOLUTION INTRAVENOUS at 11:01

## 2023-01-26 RX ADMIN — ENOXAPARIN SODIUM 30 MG: 100 INJECTION SUBCUTANEOUS at 10:20

## 2023-01-26 RX ADMIN — BENZTROPINE MESYLATE 2 MG: 2 TABLET ORAL at 09:51

## 2023-01-26 RX ADMIN — LEVOTHYROXINE SODIUM 37.5 MCG: 0.07 TABLET ORAL at 06:00

## 2023-01-26 RX ADMIN — ONDANSETRON 4 MG: 2 INJECTION INTRAMUSCULAR; INTRAVENOUS at 02:00

## 2023-01-26 RX ADMIN — METRONIDAZOLE 500 MG: 500 INJECTION, SOLUTION INTRAVENOUS at 00:38

## 2023-01-26 RX ADMIN — ROPINIROLE HYDROCHLORIDE 0.5 MG: 1 TABLET, FILM COATED ORAL at 14:23

## 2023-01-26 RX ADMIN — SODIUM CHLORIDE, PRESERVATIVE FREE 10 ML: 5 INJECTION INTRAVENOUS at 11:01

## 2023-01-26 RX ADMIN — METRONIDAZOLE 500 MG: 500 TABLET ORAL at 15:46

## 2023-01-26 ASSESSMENT — ENCOUNTER SYMPTOMS
DIARRHEA: 1
BACK PAIN: 1
RESPIRATORY NEGATIVE: 1
EYES NEGATIVE: 1
ABDOMINAL PAIN: 1
COLOR CHANGE: 0
WHEEZING: 0
SORE THROAT: 0
ABDOMINAL DISTENTION: 0
ANAL BLEEDING: 0
CONSTIPATION: 0
SHORTNESS OF BREATH: 0
SINUS PRESSURE: 0
EYE DISCHARGE: 0
EYE ITCHING: 0
NAUSEA: 1
COUGH: 0
CHEST TIGHTNESS: 0
EYE PAIN: 0
VOMITING: 0

## 2023-01-26 ASSESSMENT — PAIN SCALES - GENERAL
PAINLEVEL_OUTOF10: 9
PAINLEVEL_OUTOF10: 10

## 2023-01-26 ASSESSMENT — PAIN DESCRIPTION - LOCATION: LOCATION: ABDOMEN

## 2023-01-26 ASSESSMENT — PAIN DESCRIPTION - ORIENTATION: ORIENTATION: UPPER

## 2023-01-26 NOTE — PROGRESS NOTES
...Discharge teaching and instructions for diagnosis of hypotension and UTI completed with patient using teachback method. AVS reviewed. Printed prescriptions given to patient. Patient voiced understanding regarding prescriptions, follow up appointments, and care of self at home.  Discharged in a wheelchair to  assisted living per EMS transportation

## 2023-01-26 NOTE — PROGRESS NOTES
[facility] -   Infectious diseases evaluation  admission date 1/23/2023    reason for consultation:   Shock    Impression :   Current:  E. coli UTI, pansensitive  Shock resolved  Possible colitis, GI panel was negative, stool for C. difficile pending  Possible intrahepatic biliary ductal dilatation on CT abdomen/unremarkable LFTs on 1/23/2023. Parkinson's disease  History of seizure  Bipolar disorder  Penicillin/ertapenem allergy    Recommendations   Discontinue IV ceftriaxone and Flagyl  P.o. Keflex and Flagyl through 1/30/2023  Follow blood  cultures, no growth to date  Gallbladder ultrasound 1/24/2023 unremarkable  Continue supportive care    Infection Control Recommendations   [place] Precautions      Antimicrobial Stewardship Recommendations   Simplification of therapy  Targeted therapy      History of Present Illness:   Initial history:  Marlin Valentine is a 63y.o.-year-old female was brought to the hospital via EMS from a nursing facility for generalized weakness associated with nausea, diarrhea and vomiting for around 3 weeks prior to admission. Symptoms moderate to severe, no alleviating or aggravating factors . The patient was found to be hypotensive by EMS with reported systolic blood pressure in the 60s. She received Fluid resuscitation and was started on Levophed. The patient is poor historian, states that she had history of colitis, had no bowel movement since arrival to the ICU, stool studies not sent. Initial lactic acid was 4.3 then 2.4. Initial WBC 4.5, hemoglobin 12.7, creatinine 0.41, procalcitonin 0.03  Chest x-ray showed left helium fullness. CT chest abdomen and pelvis suggestive of colitis, minimal intrahepatic biliary ductal prominence, no left hilar mass, L2-L3 compression deformities.   Ethanol level was 17  Drug screen was positive for opiates and oxycodone  Urinalysis showed many bacteria, negative nitrate, negative leukocyte esterase, 0-2 WBC  Interval changes  1/26/2023   She is feeling better today, afebrile, blood pressure stable, off Levophed, still complaining of abdominal discomfort and nausea. She had 1 bowel movement earlier. Urine culture grew E. coli that was pansensitive  Patient Vitals for the past 8 hrs:   BP Temp Temp src Pulse Resp SpO2   01/26/23 1330 120/72 98.7 °F (37.1 °C) Oral 67 16 97 %   01/26/23 1245 120/71 98.8 °F (37.1 °C) Oral 62 16 97 %   01/26/23 1003 (!) 122/94 -- -- -- -- --           I have personally reviewed the past medical history, past surgical history, medications, social history, and family history, and I haveupdated the database accordingly. Allergies:   Penicillins, Ertapenem, Naproxen, and Valproic acid     Review of Systems:     Review of Systems  As per history of present illness, other than above 12 system review was negative. Physical Examination :       Physical Exam  Constitutional:       General: She is not in acute distress. HENT:      Head: Normocephalic and atraumatic. Right Ear: External ear normal.      Left Ear: External ear normal.   Eyes:      General: No scleral icterus. Conjunctiva/sclera: Conjunctivae normal.   Cardiovascular:      Rate and Rhythm: Normal rate and regular rhythm. Heart sounds: No murmur heard. Pulmonary:      Effort: Pulmonary effort is normal. No respiratory distress. Abdominal:      Palpations: Abdomen is soft. Tenderness: There is abdominal tenderness. There is no right CVA tenderness, left CVA tenderness, guarding or rebound. Musculoskeletal:         General: No swelling. Cervical back: Neck supple. No rigidity. Right lower leg: No edema. Left lower leg: No edema. Skin:     Coloration: Skin is not jaundiced. Findings: No bruising. Neurological:      General: No focal deficit present. Mental Status: She is alert. Past Medical History:   History reviewed.  No pertinent past medical history. Past Surgical  History:   History reviewed. No pertinent surgical history. Medications:      midodrine  10 mg Oral TID WC    sodium chloride flush  5-40 mL IntraVENous 2 times per day    enoxaparin  30 mg SubCUTAneous Daily    pantoprazole (PROTONIX) 40 mg injection  40 mg IntraVENous Daily    cefTRIAXone (ROCEPHIN) IV  1,000 mg IntraVENous Q24H    levothyroxine  37.5 mcg Oral Daily    benztropine  2 mg Oral BID    rOPINIRole  0.5 mg Oral TID       Social History:     Social History     Socioeconomic History    Marital status:      Spouse name: Not on file    Number of children: Not on file    Years of education: Not on file    Highest education level: Not on file   Occupational History    Not on file   Tobacco Use    Smoking status: Every Day     Packs/day: 0.25     Types: Cigarettes    Smokeless tobacco: Never   Substance and Sexual Activity    Alcohol use: Not on file    Drug use: Not on file    Sexual activity: Not on file   Other Topics Concern    Not on file   Social History Narrative    Not on file     Social Determinants of Health     Financial Resource Strain: Not on file   Food Insecurity: Not on file   Transportation Needs: Not on file   Physical Activity: Not on file   Stress: Not on file   Social Connections: Not on file   Intimate Partner Violence: Not on file   Housing Stability: Not on file       Family History:   History reviewed. No pertinent family history.    Medical Decision Making:   I have independently reviewed/ordered the following labs:    CBC with Differential:   Recent Labs     01/25/23 0347 01/26/23  0517   WBC 5.5 7.0   HGB 11.0* 10.8*   HCT 33.6* 31.9*    206   LYMPHOPCT 30 16*   MONOPCT 8* 7       BMP:  Recent Labs     01/25/23  0347 01/26/23  0517    144   K 3.6* 3.6*   * 112*   CO2 19* 24   BUN 6* 8   CREATININE <0.40* 0.51       Hepatic Function Panel:   Recent Labs     01/23/23 2127   PROT 5.9*   LABALBU 3.8   BILITOT 0.2*   ALKPHOS 55 ALT <5*   AST 15       No results for input(s): RPR in the last 72 hours. No results for input(s): HIV in the last 72 hours. No results for input(s): BC in the last 72 hours. Lab Results   Component Value Date/Time    CREATININE 0.51 01/26/2023 05:17 AM    GLUCOSE 125 01/26/2023 05:17 AM    GLUCOSE 121 09/06/2011 05:00 AM       Detailed results: Thank you for allowing us to participate in the care of this patient. Please call with questions. This note is created with the assistance of a speech recognition program.  While intending to generate adocument that actually reflects the content of the visit, the document can still have some errors including those of syntax and sound a like substitutions which may escape proof reading. It such instances, actual meaningcan be extrapolated by contextual diversion.     Joe Martinez MD  Office: [phone]  Perfect serve / office [phone]

## 2023-01-26 NOTE — CARE COORDINATION
Writer left message for Brian, at [address] [facility] [place], to check status, of DC today.

## 2023-01-26 NOTE — PROGRESS NOTES
Pulmonary Progress Note  [facility]      Patient - Kristie Soto,  Age - 63 y.o.    - 1960      Room Number - 2096/2096-01   N -  [id number]   [facility]  Date of Admission -  2023  9:13 PM        Johanne Stoll MD  Primary Care Physician - No primary care provider on file. SUBJECTIVE   Remains on room air, no pulmonary complaints    OBJECTIVE   VITALS    height is 4' 11\" (1.499 m) and weight is 104 lb 8 oz (47.4 kg). Her oral temperature is 98.3 °F (36.8 °C). Her blood pressure is 105/67 and her pulse is 66. Her respiration is 15 and oxygen saturation is 95%. Body mass index is 21.11 kg/m². Temperature Range: Temp: 98.3 °F (36.8 °C) Temp  Av.5 °F (36.9 °C)  Min: 98.3 °F (36.8 °C)  Max: 98.7 °F (37.1 °C)  BP Range:  Systolic (53TIE), I , Min:94 , JYP:445     Diastolic (28JEM), XY, Min:40, Max:113    Pulse Range: Pulse  Av.7  Min: 53  Max: 96  Respiration Range: Resp  Av.2  Min: 11  Max: 31  Current Pulse Ox[[id number]]  SpO2: 95 %  24HR Pulse Ox Range:  SpO2  Av.4 %  Min: 85 %  Max: 100 %  Oxygen Amount and Delivery: Wt Readings from Last 3 Encounters:   23 104 lb 8 oz (47.4 kg)   22 75 lb (34 kg)   22 100 lb (45.4 kg)       I/O (24 Hours)    Intake/Output Summary (Last 24 hours) at 2023 0958  Last data filed at 2023 0827  Gross per 24 hour   Intake --   Output 3150 ml   Net -3150 ml       EXAM     General Appearance  Awake, alert, oriented, in no acute distress  HEENT - normocephalic, atraumatic.  []  Mallampati  [] Crowded airway   [] Macroglossia  []  Retrognathia  [] Micrognathia  []  Normal tongue size []  Normal Bite  [] Moe sign positive    Neck - Supple,  trachea midline   Lungs -clear  Cardiovascular - Heart sounds are normal.  Regular rate and rhythm   Abdomen - Soft, nontender, nondistended, no masses or organomegaly  Neurologic - There are no focal motor or sensory deficits tremor consistent with Parkinson's  Skin - No bruising or bleeding  Extremities - No clubbing, cyanosis, edema    MEDS      midodrine  10 mg Oral TID WC    sodium chloride flush  5-40 mL IntraVENous 2 times per day    enoxaparin  30 mg SubCUTAneous Daily    pantoprazole (PROTONIX) 40 mg injection  40 mg IntraVENous Daily    cefTRIAXone (ROCEPHIN) IV  1,000 mg IntraVENous Q24H    levothyroxine  37.5 mcg Oral Daily    benztropine  2 mg Oral BID    rOPINIRole  0.5 mg Oral TID      lactated ringers IV soln 100 mL/hr at 01/26/23 0600    sodium chloride      norepinephrine Stopped (01/25/23 0532)    dextrose       oxyCODONE-acetaminophen, sodium chloride flush, sodium chloride, ondansetron **OR** ondansetron, magnesium hydroxide, acetaminophen **OR** acetaminophen, potassium chloride, droperidol, sodium chloride flush, glucose, dextrose bolus **OR** dextrose bolus, glucagon (rDNA), dextrose    LABS   CBC   Recent Labs     01/26/23  0517   WBC 7.0   HGB 10.8*   HCT 31.9*   MCV 94.2        BMP:   Lab Results   Component Value Date/Time     01/26/2023 05:17 AM    K 3.6 01/26/2023 05:17 AM     01/26/2023 05:17 AM    CO2 24 01/26/2023 05:17 AM    BUN 8 01/26/2023 05:17 AM    LABALBU 3.8 01/23/2023 09:27 PM    CREATININE 0.51 01/26/2023 05:17 AM    CALCIUM 8.9 01/26/2023 05:17 AM    GFRAA CANNOT BE CALCULATED 06/14/2021 07:07 PM    LABGLOM >60 01/26/2023 05:17 AM     ABGs:  Lab Results   Component Value Date/Time    PHART 7.363 06/14/2021 07:13 PM    PO2ART 352.0 06/14/2021 07:13 PM    GJM0YPV 39.7 06/14/2021 07:13 PM      Lab Results   Component Value Date/Time    MODE PRVC 06/14/2021 07:13 PM     Ionized Calcium:  No results found for: IONCA  Magnesium:    Lab Results   Component Value Date/Time    MG 2.4 06/14/2021 07:07 PM     Phosphorus:  No results found for: PHOS     LIVER PROFILE   Recent Labs     01/23/23  2127   AST 15   ALT <5* LIPASE 29   BILITOT 0.2*   ALKPHOS 55     INR   Recent Labs     01/23/23 2127   INR 1.0     PTT   Lab Results   Component Value Date    APTT 30.6 06/14/2021         RADIOLOGY     (See actual reports for details)    ASSESSMENT/PLAN       SIRS with hypotension  Lactic acidosis-resolved  Hypovolemia  History of GERD  Hypothyroidism  History of Parkinson's disease  History of seizure disorder-not on any medications  Central lobar emphysema versus cystic lesions    Hemodynamically stable  Continues to have multiple complaints including diarrhea even though nurse says that she has formed stool  No acute pulmonary or critical care issues  Okay to discharge from our standpoint  We will sign off  Follow-up in the office if needed within 6 weeks      Electronically signed by Roxy Briceno MD on 1/26/2023 at 9:41 AM

## 2023-01-26 NOTE — PLAN OF CARE
Problem: Discharge Planning  Goal: Discharge to home or other facility with appropriate resources  1/26/2023 1608 by Kj Francois RN  Outcome: Adequate for Discharge  1/26/2023 5924 by Wai Robles RN  Outcome: Progressing     Problem: Skin/Tissue Integrity  Goal: Absence of new skin breakdown  Description: 1. Monitor for areas of redness and/or skin breakdown  2. Assess vascular access sites hourly  3. Every 4-6 hours minimum:  Change oxygen saturation probe site  4. Every 4-6 hours:  If on nasal continuous positive airway pressure, respiratory therapy assess nares and determine need for appliance change or resting period.   1/26/2023 1608 by jK Francois RN  Outcome: Adequate for Discharge  1/26/2023 2464 by Wai Robles RN  Outcome: Progressing     Problem: Safety - Adult  Goal: Free from fall injury  1/26/2023 1608 by Kj Francois RN  Outcome: Adequate for Discharge  1/26/2023 2212 by Wai Robles RN  Outcome: Adequate for Discharge  Note: Bed alarm on     Problem: ABCDS Injury Assessment  Goal: Absence of physical injury  1/26/2023 1608 by Kj Francois RN  Outcome: Adequate for Discharge  1/26/2023 6099 by Wai Robles RN  Outcome: Progressing     Problem: Nutrition Deficit:  Goal: Optimize nutritional status  Outcome: Adequate for Discharge

## 2023-01-26 NOTE — PLAN OF CARE
Problem: Discharge Planning  Goal: Discharge to home or other facility with appropriate resources  Outcome: Progressing     Problem: Skin/Tissue Integrity  Goal: Absence of new skin breakdown  Description: 1. Monitor for areas of redness and/or skin breakdown  2. Assess vascular access sites hourly  3. Every 4-6 hours minimum:  Change oxygen saturation probe site  4. Every 4-6 hours:  If on nasal continuous positive airway pressure, respiratory therapy assess nares and determine need for appliance change or resting period.   Outcome: Progressing     Problem: ABCDS Injury Assessment  Goal: Absence of physical injury  Outcome: Progressing     Problem: Safety - Adult  Goal: Free from fall injury  Outcome: Adequate for Discharge  Note: Bed alarm on

## 2023-01-26 NOTE — PROGRESS NOTES
[address] [facility] [place]    PROGRESS NOTE             1/26/2023    8:35 AM    Name:   Meaghan Rubin  MRN:     [id number]     Acct:      [de-identified]   Room:   2096/2096-01   Day:  3  Admit Date:  1/23/2023  9:13 PM    PCP:  No primary care provider on file. Code Status:  Full Code    Subjective:     C/C:   Chief Complaint   Patient presents with    Fatigue    Other     Hypotension     Interval History Status: not changed. Patient seen and examined at bedside. No acute events overnight. Vitals have remained stable. Blood pressure stable overnight. Remains off Levophed. Transferred out of the ICU last night. She reports not sleeping overnight due to restless leg symptoms. She was shaking when seen this morning. Continues to report diffuse abdominal pain and nausea. Had a bowel movement this morning that was liquid. First bowel movement since admission. Denies chest pain, shortness of breath. Brief History:     The patient is a 63 y.o. Non- / non  female who presents withFatigue and Other (Hypotension)   and she is admitted to the hospital for the management of septic shock and colitis. Past medical history includes bipolar type I disorder, Parkinson's disease, chronic colitis, seizure disorder, Paget's disease, hypothyroidism. Patient states that she has been having abdominal pain, diarrhea, nausea and vomiting for the last 3 to 4 weeks. This vomit has been yellow and nonbloody. The diarrhea she reports having 1 recent bowel movement with blood in it. Yesterday her roommate noticed that she looked very sick and asked the facility to call EMS. On arrival, EMS found her to have a blood pressure in the 60s over 30s.  2 L of IV fluid given in route to the hospital.  Eventually required a right femoral central line due to septic shock requiring Levophed.   CT chest abdomen pelvis showed colitis of the ascending colon, unremarkable chest.  Lactic acid of 4.3, most recent of 2.4 this morning. UA showed many bacteria. Urine culture in progress. Blood cultures show no growth thus far. Urine tox screen in the ED was positive for barbiturates and opiates. Patient started on IV Vanco cefepime and Flagyl. Remains on Levophed. She is afebrile currently. Although reports having a fever over last few days. Review of Systems:     Review of Systems   Constitutional: Negative. Negative for chills, fatigue and fever. HENT: Negative. Negative for congestion, sinus pressure, sneezing and sore throat. Eyes: Negative. Negative for pain, discharge and itching. Respiratory: Negative. Negative for cough, chest tightness, shortness of breath and wheezing. Cardiovascular: Negative. Negative for chest pain, palpitations and leg swelling. Gastrointestinal:  Positive for abdominal pain, diarrhea and nausea. Negative for abdominal distention, anal bleeding, constipation and vomiting. Endocrine: Negative. Genitourinary: Negative. Negative for dysuria, frequency and urgency. Musculoskeletal:  Positive for back pain. Negative for arthralgias, gait problem, joint swelling, myalgias, neck pain and neck stiffness. Skin: Negative. Negative for color change, pallor, rash and wound. Neurological: Negative. Negative for dizziness, weakness, light-headedness, numbness and headaches. Psychiatric/Behavioral: Negative. Negative for confusion. The patient is not nervous/anxious. Medications: Allergies:     Allergies   Allergen Reactions    Penicillins Shortness Of Breath     Throat swelling    Ertapenem      invanz    Naproxen      History of bleeding ulcer    Valproic Acid Other (See Comments)     Raised ammonia levels       Current Meds:   Scheduled Meds:    midodrine  10 mg Oral TID WC    sodium chloride flush  5-40 mL IntraVENous 2 times per day    enoxaparin  30 mg SubCUTAneous Daily    pantoprazole (PROTONIX) 40 mg injection  40 mg IntraVENous Daily    cefTRIAXone (ROCEPHIN) IV  1,000 mg IntraVENous Q24H    levothyroxine  37.5 mcg Oral Daily    benztropine  2 mg Oral BID    rOPINIRole  0.5 mg Oral TID     Continuous Infusions:    lactated ringers IV soln 100 mL/hr at 23 0600    sodium chloride      norepinephrine Stopped (23 0532)    dextrose       PRN Meds: oxyCODONE-acetaminophen, sodium chloride flush, sodium chloride, ondansetron **OR** ondansetron, magnesium hydroxide, acetaminophen **OR** acetaminophen, potassium chloride, droperidol, sodium chloride flush, glucose, dextrose bolus **OR** dextrose bolus, glucagon (rDNA), dextrose    Data:     Past Medical History:   has no past medical history on file. Social History:   reports that she has been smoking. She has been smoking an average of .25 packs per day. She has never used smokeless tobacco.     Family History: History reviewed. No pertinent family history. Vitals:  /67   Pulse 66   Temp 98.3 °F (36.8 °C) (Oral)   Resp 15   Ht 4' 11\" (1.499 m)   Wt 104 lb 8 oz (47.4 kg)   SpO2 95%   BMI 21.11 kg/m²   Temp (24hrs), Av.5 °F (36.9 °C), Min:98.3 °F (36.8 °C), Max:98.7 °F (37.1 °C)    Recent Labs     23  0052 23  0130 23  0318   POCGLU 74 274* 139*       I/O(24Hr): Intake/Output Summary (Last 24 hours) at 2023 0835  Last data filed at 2023 0827  Gross per 24 hour   Intake --   Output 3150 ml   Net -3150 ml       Labs:  [unfilled]    Lab Results   Component Value Date/Time    SPECIAL LAC 10CC EACH 2023 10:00 PM     Lab Results   Component Value Date/Time    CULTURE ESCHERICHIA COLI >968320 CFU/ML (A) 2023 11:32 PM       [unfilled]    Radiology:    Kellee Vigil RUQ    Result Date: 2023  EXAMINATION: RIGHT UPPER QUADRANT ULTRASOUND 2023 1:25 pm COMPARISON: None.  HISTORY: ORDERING SYSTEM PROVIDED HISTORY: Rule out gallbladder pathology TECHNOLOGIST PROVIDED HISTORY: Rule out gallbladder pathology FINDINGS: LIVER:  The liver demonstrates normal echogenicity without evidence of intrahepatic biliary ductal dilatation. Hepatopetal flow portal vein. Liver 13.9 cm in length. BILIARY SYSTEM:  Gallbladder is unremarkable without evidence of pericholecystic fluid, wall thickening or stones. Negative sonographic Walsh's sign. Common bile duct is within normal limits measuring 3.8 mm. RIGHT KIDNEY: The right kidney is grossly unremarkable without evidence of hydronephrosis. PANCREAS:  Visualized portions of the pancreas are unremarkable. OTHER: No evidence of right upper quadrant ascites. Unremarkable right upper quadrant ultrasound. XR CHEST PORTABLE    Result Date: 1/23/2023  EXAMINATION: ONE XRAY VIEW OF THE CHEST 1/23/2023 9:29 pm COMPARISON: None. HISTORY: ORDERING SYSTEM PROVIDED HISTORY: chest pain TECHNOLOGIST PROVIDED HISTORY: chest pain Reason for Exam: Chest pain, took best image per pt condition FINDINGS: The cardiomediastinal silhouette is normal in size. There is prominence/fullness of the left hilar region, nonspecific. No focal airspace disease. No pleural effusion or pneumothorax. No evidence of acute osseous abnormality. 1.  Nonspecific fullness/prominence of the left hilum likely secondary to vessels and lymph nodes, although an underlying lesion is not excluded. Consider CT chest with contrast for further evaluation. 2.  No focal airspace disease. CT CHEST ABDOMEN PELVIS W CONTRAST Additional Contrast? None    Result Date: 1/23/2023  EXAMINATION: CT OF THE CHEST, ABDOMEN, AND PELVIS WITH CONTRAST 1/23/2023 10:01 pm TECHNIQUE: CT of the chest, abdomen and pelvis was performed with the administration of intravenous contrast. Multiplanar reformatted images are provided for review. Automated exposure control, iterative reconstruction, and/or weight based adjustment of the mA/kV was utilized to reduce the radiation dose to as low as reasonably achievable. COMPARISON: None HISTORY: ORDERING SYSTEM PROVIDED HISTORY: abdominal pain TECHNOLOGIST PROVIDED HISTORY: abdominal pain Reason for Exam: abdominal pain, abnormal chest xray Additional signs and symptoms: hip surgeries FINDINGS: Chest: Chest Wall and Thoracic Inlet: No axillary or supraclavicular lymphadenopathy. The thyroid gland is unremarkable. Mediastinum and Luisa: No mediastinal or hilar lymphadenopathy. Normal caliber of the thoracic aorta. Borderline dilation of the main pulmonary artery measuring up to 3.2 cm. No central pulmonary embolism. Borderline cardiomegaly. No pericardial effusion. Lungs/Pleura: Mild centrilobular emphysema. Dependent hypoventilatory changes of the lungs. Bilateral lower lobe bronchiectasis. No evidence of left hilar mass. No pleural effusion. Bones: No suspicious osseous findings. Remote right lateral 5th-7th rib fractures. Subacute to remote right lateral 9th rib fracture. Abdomen/Pelvis: Organs: The liver is unremarkable. Mild intrahepatic biliary ductal prominence. The common bile duct is within normal limits in caliber. The gallbladder is unremarkable. The spleen, pancreas, and adrenal glands are unremarkable. Left upper pole renal cyst demonstrates hairline thin internal septations. No hydronephrosis or renal calculi. GI/Bowel: No evidence of bowel obstruction. There is mucosal hyperenhancement and wall thickening of the ascending colon compatible with colitis. Mild surrounding inflammatory stranding. Pelvis: Distended bladder. Retroverted uterus with an associated exophytic anterior uterine fibroid. Peritoneum/Retroperitoneum: No retroperitoneal, mesenteric, or pelvic lymphadenopathy. No free fluid or free intraperitoneal gas. The abdominal aorta demonstrates scattered atherosclerosis without aneurysm. Multiple lucent centered venous phleboliths are seen along the right pelvic sidewall.  Bones/Soft Tissues: Age-indeterminate compression deformities of the L2 and L3 vertebral bodies. Moderate to severe degenerative disc disease at the L4-L5 level. 1. Mucosal hyperenhancement, moderate wall thickening, and surrounding inflammatory stranding of the ascending colon compatible with colitis. 2.  Minimal intrahepatic biliary ductal prominence without common bile duct dilation. Consider correlation with  LFTs if clinically indicated. 3.  No evidence of left hilar mass. Hilar prominence noted on same day chest radiograph corresponds to prominent hilar vessels. 4.  Age-indeterminate compression deformities of the L2 and L3 vertebral bodies. Correlate with clinical symptoms. Physical Examination:        Physical Exam  Constitutional:       General: She is not in acute distress. Appearance: Normal appearance. She is normal weight. HENT:      Mouth/Throat:      Mouth: Mucous membranes are moist.      Pharynx: Oropharynx is clear. Eyes:      Extraocular Movements: Extraocular movements intact. Conjunctiva/sclera: Conjunctivae normal.      Pupils: Pupils are equal, round, and reactive to light. Cardiovascular:      Rate and Rhythm: Normal rate and regular rhythm. Pulses: Normal pulses. Heart sounds: Normal heart sounds. Pulmonary:      Effort: Pulmonary effort is normal.      Breath sounds: Normal breath sounds. Abdominal:      General: Abdomen is flat. Bowel sounds are normal. There is no distension. Palpations: Abdomen is soft. Comments: Diffuse tenderness to palpation. More severe lower abdomen. Musculoskeletal:         General: Normal range of motion. Cervical back: Normal range of motion and neck supple. Skin:     General: Skin is warm and dry. Neurological:      General: No focal deficit present. Mental Status: She is alert and oriented to person, place, and time. Mental status is at baseline.          Assessment:        Primary Problem  Colitis    Active Hospital Problems    Diagnosis Date Noted Hypotension [I95.9] 01/25/2023     Priority: Medium    Hypovolemia [E86.1] 01/24/2023     Priority: Medium    History of seizures [Z87.898] 01/24/2023     Priority: Medium    Hypothyroidism [E03.9] 01/24/2023     Priority: Medium    Colitis [K52.9] 01/23/2023     Priority: Medium    Septic shock ([address] [A41.9, R65.21] 01/23/2023     Priority: Medium       Plan:        Septic shock likely secondary to colitis in the setting of hypovolemia  - Continues to have abdominal pain  - Liquid bowel movement this morning  - Occult blood pending  - Off Levophed. Midodrine 10 mg 3 times daily started. Blood pressure remained stable overnight.  - IV Rocephin and Flagyl  - Blood culture negative thus far  - CT 1/23/2023 shows ascending colitis and possible CBD dilation  - Ultrasound gallbladder unremarkable  - GI panel pending. C. difficile pending  - Continue lactated Ringer's  mL/h  - General surgery following. No acute surgical intervention at this time. Dysphagia soft and bite-size diet  - General surgery, GI, ID following    Urinary tract infection, E. coli  - Remains on IV Rocephin and Flagyl  - Pending sensitivity  - ID following     Bipolar 1 disorder  - Currently resides at inpatient psychiatric rehabilitation in [place]  - Restarted home med benzatropine 2 mg twice daily     History of Parkinson's disease and restless leg syndrome  - Requip 0.5 mg 3 times daily for restless legs  - Patient could not sleep overnight due to restless legs.   Actively shaking at bedside when seen this morning     History of seizures  - Admitted to [facility] in June 2021 due to seizures  - Not currently on any at home seizure medication per chart review    Hypokalemia  - Potassium 3.6 this morning  - Replacement protocol     Hypothyroidism  - Not currently on Synthroid at home  - TSH of 0.53     DVT prophylaxis: Lovenox 30 mg subcu daily  GI prophylaxis: Protonix 40 mg IV daily  Diet: Clear liquids  Full code  PT/OT/social work    Najma Diaz MD  1/26/2023  8:35 AM      Attending Physician Statement  I have discussed the care of Angelic Johnson and I have examined the patient myselft and taken ros and hpi , including pertinent history and exam findings,  with the resident. I have reviewed the key elements of all parts of the encounter with the resident. I agree with the assessment, plan and orders as documented by the resident.   Patient, clinically doing much better  Off pressors  Abdominal pain is better  Had 1 episode of loose stools  UTI, sensitive to Rocephin  DC plan on Ceftin    Electronically signed by Shruthi Mullen MD

## 2023-01-26 NOTE — CARE COORDINATION
Writer was notified, by Brian, from [address] [place] [address], that they CAN accept her back. Writer has transport set up for today at 4:15 w/ [address] [facility] for . Per Brian, they only have a Nurse til 7PM & they want her there prior to that. Pt. Informed & states understanding.

## 2023-01-26 NOTE — CARE COORDINATION
ONGOING DISCHARGE PLAN:    Patient is alert and oriented x4. Spoke with patient regarding discharge plan and patient confirms that plan is still to return back to [facility]. Pt. Remains on IV Ceftriaxone, ID following. 95% on RA, Pulmonary on board. Pt. May need Ambulette, back to [place], she will check w/ her Son, to see if he can transport. Denies VNS. ?DC today. Will continue to follow for additional discharge needs.     Electronically signed by Gucci Rose RN on 1/26/2023 at 11:55 AM

## 2023-01-26 NOTE — CARE COORDINATION
Writer spoke to Brian, Director of [facility]isted [facility], [phone] & she informed writer that they may not be taking pt. Back, for \"They found substances, in her blood work that were not giving her there\". Pt. Was + for Barbituates, Oxycodone, Opiates. Writer is awaiting a return call from Brian, to get updates. Writer has set up transport w/ [facility], [place], , for today at [address] [facility].     Will follow.

## 2023-01-26 NOTE — DISCHARGE INSTR - COC
Continuity of Care Form    Patient Name: Cooper Asif   :  1960  MRN:  [id number]    Admit date:  2023  Discharge date:  ***    Code Status Order: Full Code   Advance Directives:     Admitting Physician:  Ady Long MD  PCP: No primary care provider on file. Discharging Nurse: [facility] Unit/Room#: 2096/2096-01  Discharging Unit Phone Number: ***    Emergency Contact:   Extended Emergency Contact Information  Primary Emergency Contact: Marcel Alas  Address: [place]  Home Phone: [phone]  Relation: Child  Secondary Emergency Contact: Adria Alas  Address: [place]  Home Phone: [phone]  Relation: Other    Past Surgical History:  History reviewed. No pertinent surgical history. Immunization History: There is no immunization history on file for this patient.     Active Problems:  Patient Active Problem List   Diagnosis Code    Closed fracture of left proximal humerus S42.202A    Colitis K52.9    Septic shock (HCC) A41.9, R65.21    Hypovolemia E86.1    History of seizures Z87.898    Hypothyroidism E03.9    Hypotension I95.9       Isolation/Infection:   Isolation            C Diff Contact          Patient Infection Status       Infection Onset Added Last Indicated Last Indicated By Review Planned Expiration Resolved Resolved By    C-diff Rule Out 23 C DIFF TOXIN/ANTIGEN (Ordered) 23              Nurse Assessment:  Last Vital Signs: BP (!) 122/94   Pulse 66   Temp 98.3 °F (36.8 °C) (Oral)   Resp 15   Ht 4' 11\" (1.499 m)   Wt 104 lb 8 oz (47.4 kg)   SpO2 95%   BMI 21.11 kg/m²     Last documented pain score (0-10 scale): Pain Level: 10  Last Weight:   Wt Readings from Last 1 Encounters:   23 104 lb 8 oz (47.4 kg)     Mental Status:  {IP PT MENTAL STATUS:96140}    IV Access:  {[facility] IV ACCESS:125457536}    Nursing Mobility/ADLs:  Walking   {[facility] NLBY:355831751}  Transfer  {[facility] ZQCP:481582918}  Bathing  {[facility] D[facility] OZGH:829992689}  Dressing  {[facility] D[facility] BHLD:942916358}  Toileting  {[facility] D[facility] HUQ}  Feeding  {[facility] D[facility] HVXA:351597009}  Med Admin  {[facility] D[facility] PKUT:741115233}  Med Delivery   { [facility] MED Delivery:369517407}    Wound Care Documentation and Therapy:        Elimination:  Continence:    Bowel: {YES / CY:26602}  Bladder: {YES / A}  Urinary Catheter: {Urinary Catheter:516517003}   Colostomy/Ileostomy/Ileal Conduit: {YES / YY:93606}       Date of Last BM: ***    Intake/Output Summary (Last 24 hours) at 2023 1155  Last data filed at 2023 0827  Gross per 24 hour   Intake --   Output 2550 ml   Net -2550 ml     I/O last 3 completed shifts:  In: -   Out: 4250 [Urine:4250]    Safety Concerns:     [address] Safety Concerns:985601103}    Impairments/Disabilities:      [address] Impairments/Disabilities:309327417}    Nutrition Therapy:  Current Nutrition Therapy:   [address] Diet List:275259994}    Routes of Feeding: {[facility] DME Other Feedings:982124533}  Liquids: {Slp liquid thickness:74377}  Daily Fluid Restriction: {[facility] DME Yes amt example:694504670}  Last Modified Barium Swallow with Video (Video Swallowing Test): {Done Not Done ZWOH:939849704}    Treatments at the Time of Hospital Discharge:   Respiratory Treatments: ***  Oxygen Therapy:  {Therapy; copd oxygen:93898}  Ventilator:    { CC Vent NZTX:218908218}    Rehab Therapies: {THERAPEUTIC INTERVENTION:5488705756}  Weight Bearing Status/Restrictions: [address] [employer] Weight Bearin}  Other Medical Equipment (for information only, NOT a DME order):  {EQUIPMENT:938639409}  Other Treatments: ***    Patient's personal belongings (please select all that are sent with patient):  {[facility] DME Belongings:374117103}    RN SIGNATURE:  {Esignature:626324990}    CASE MANAGEMENT/SOCIAL WORK SECTION    Inpatient Status Date: ***    Readmission Risk Assessment Score:  Readmission Risk              Risk of Unplanned Readmission:  14 Discharging to Facility/ Agency   Name:   Address:  Phone:  Fax:    Dialysis Facility (if applicable)   Name:  Address:  Dialysis Schedule:  Phone:  Fax:    / signature: {Esignature:783015535}    PHYSICIAN SECTION    Prognosis: {Prognosis:0398623925}    Condition at Discharge: Micheal Troy Patient Condition:407251529}    Rehab Potential (if transferring to Rehab): {Prognosis:0417756363}    Recommended Labs or Other Treatments After Discharge: ***    Physician Certification: I certify the above information and transfer of Sharmila Salazar  is necessary for the continuing treatment of the diagnosis listed and that she requires {Admit to Appropriate Level of Care:47655} for {GREATER/LESS:169439706} 30 days.      Update Admission H&P: {[facility] DME Changes in NCEUO:057381728}    PHYSICIAN SIGNATURE:  Electronically signed by Anny Young MD on 1/26/23 at 11:55 AM EST

## 2023-01-26 NOTE — PROGRESS NOTES
GI Progress notes    1/26/2023   12:21 PM    Name:  Michelle Chase  MRN:    [id number]     Acct:     [de-identified]   Room:  2096/2096-01   Day: 3     Admit Date: 1/23/2023  9:13 PM  PCP: No primary care provider on file. Subjective:     C/C:   Chief Complaint   Patient presents with    Fatigue    Other     Hypotension       Interval History: Status: improved. Patient seen and examined  No acute events overnight  Off Levo  Tolerating diet  Minimal nausea  No pain during examination  Reports 1 loose bowel movement today. No bowel movements yesterday or overnight. ROS:  Constitutional: negative for chills, fevers and sweats  Gastrointestinal: negative for abdominal pain, constipation, nausea and vomiting  Neurological: negative for dizziness and headaches    Medications: Allergies:    Allergies   Allergen Reactions    Penicillins Shortness Of Breath     Throat swelling    Ertapenem      invanz    Naproxen      History of bleeding ulcer    Valproic Acid Other (See Comments)     Raised ammonia levels       Current Meds: oxyCODONE-acetaminophen (PERCOCET) 5-325 MG per tablet 1 tablet, Q4H PRN  midodrine (PROAMATINE) tablet 10 mg, TID WC  lactated ringers IV soln infusion, Continuous  sodium chloride flush 0.9 % injection 5-40 mL, 2 times per day  sodium chloride flush 0.9 % injection 10 mL, PRN  0.9 % sodium chloride infusion, PRN  enoxaparin Sodium (LOVENOX) injection 30 mg, Daily  ondansetron (ZOFRAN-ODT) disintegrating tablet 4 mg, Q8H PRN   Or  ondansetron (ZOFRAN) injection 4 mg, Q6H PRN  magnesium hydroxide (MILK OF MAGNESIA) 400 MG/5ML suspension 30 mL, Daily PRN  acetaminophen (TYLENOL) tablet 650 mg, Q6H PRN   Or  acetaminophen (TYLENOL) suppository 650 mg, Q6H PRN  potassium chloride 10 mEq/100 mL IVPB (Peripheral Line), PRN  norepinephrine (LEVOPHED) 16 mg in sodium chloride 0.9 % 250 mL infusion, Continuous  pantoprazole (PROTONIX) 40 mg in sodium chloride (PF) 0.9 % 10 mL injection, Daily  cefTRIAXone (ROCEPHIN) 1,000 mg in sodium chloride 0.9 % 50 mL IVPB mini-bag, Q24H  droperidol (INAPSINE) injection 0.625 mg, Q4H PRN  levothyroxine (SYNTHROID) tablet 37.5 mcg, Daily  benztropine (COGENTIN) tablet 2 mg, BID  rOPINIRole (REQUIP) tablet 0.5 mg, TID  sodium chloride flush 0.9 % injection 10 mL, PRN  glucose chewable tablet 16 g, PRN  dextrose bolus 10% 125 mL, PRN   Or  dextrose bolus 10% 250 mL, PRN  glucagon (rDNA) injection 1 mg, PRN  dextrose 10 % infusion, Continuous PRN        Data:     Code Status:  Full Code    History reviewed. No pertinent family history. Social History     Socioeconomic History    Marital status:      Spouse name: Not on file    Number of children: Not on file    Years of education: Not on file    Highest education level: Not on file   Occupational History    Not on file   Tobacco Use    Smoking status: Every Day     Packs/day: 0.25     Types: Cigarettes    Smokeless tobacco: Never   Substance and Sexual Activity    Alcohol use: Not on file    Drug use: Not on file    Sexual activity: Not on file   Other Topics Concern    Not on file   Social History Narrative    Not on file     Social Determinants of Health     Financial Resource Strain: Not on file   Food Insecurity: Not on file   Transportation Needs: Not on file   Physical Activity: Not on file   Stress: Not on file   Social Connections: Not on file   Intimate Partner Violence: Not on file   Housing Stability: Not on file       Vitals:  BP (!) 122/94   Pulse 66   Temp 98.3 °F (36.8 °C) (Oral)   Resp 15   Ht 4' 11\" (1.499 m)   Wt 104 lb 8 oz (47.4 kg)   SpO2 95%   BMI 21.11 kg/m²   Temp (24hrs), Av.4 °F (36.9 °C), Min:98.3 °F (36.8 °C), Max:98.5 °F (36.9 °C)    Recent Labs     23  0052 23  0130 23  0318   POCGLU 74 274* 139*       I/O (24Hr):     Intake/Output Summary (Last 24 hours) at 2023 1221  Last data filed at 2023 0827  Gross per 24 hour   Intake --   Output 2550 ml   Net -2550 ml       Labs:      CBC:   Lab Results   Component Value Date/Time    WBC 7.0 01/26/2023 05:17 AM    RBC 3.39 01/26/2023 05:17 AM    RBC 3.54 09/06/2011 05:00 AM    HGB 10.8 01/26/2023 05:17 AM    HCT 31.9 01/26/2023 05:17 AM    MCV 94.2 01/26/2023 05:17 AM    MCH 31.8 01/26/2023 05:17 AM    MCHC 33.8 01/26/2023 05:17 AM    RDW 13.5 01/26/2023 05:17 AM     01/26/2023 05:17 AM     09/06/2011 05:00 AM    MPV 9.1 01/26/2023 05:17 AM     CBC with Differential:    Lab Results   Component Value Date/Time    WBC 7.0 01/26/2023 05:17 AM    RBC 3.39 01/26/2023 05:17 AM    RBC 3.54 09/06/2011 05:00 AM    HGB 10.8 01/26/2023 05:17 AM    HCT 31.9 01/26/2023 05:17 AM     01/26/2023 05:17 AM     09/06/2011 05:00 AM    MCV 94.2 01/26/2023 05:17 AM    MCH 31.8 01/26/2023 05:17 AM    MCHC 33.8 01/26/2023 05:17 AM    RDW 13.5 01/26/2023 05:17 AM    LYMPHOPCT 16 01/26/2023 05:17 AM    MONOPCT 7 01/26/2023 05:17 AM    BASOPCT 1 01/26/2023 05:17 AM    MONOSABS 0.50 01/26/2023 05:17 AM    LYMPHSABS 1.10 01/26/2023 05:17 AM    EOSABS 0.10 01/26/2023 05:17 AM    BASOSABS 0.00 01/26/2023 05:17 AM    DIFFTYPE NOT REPORTED 06/14/2021 07:07 PM     Hemoglobin/Hematocrit:    Lab Results   Component Value Date/Time    HGB 10.8 01/26/2023 05:17 AM    HCT 31.9 01/26/2023 05:17 AM     CMP:    Lab Results   Component Value Date/Time     01/26/2023 05:17 AM    K 3.6 01/26/2023 05:17 AM     01/26/2023 05:17 AM    CO2 24 01/26/2023 05:17 AM    BUN 8 01/26/2023 05:17 AM    CREATININE 0.51 01/26/2023 05:17 AM    GFRAA CANNOT BE CALCULATED 06/14/2021 07:07 PM    LABGLOM >60 01/26/2023 05:17 AM    GLUCOSE 125 01/26/2023 05:17 AM    GLUCOSE 121 09/06/2011 05:00 AM    PROT 5.9 01/23/2023 09:27 PM    LABALBU 3.8 01/23/2023 09:27 PM    CALCIUM 8.9 01/26/2023 05:17 AM    BILITOT 0.2 01/23/2023 09:27 PM    ALKPHOS 55 01/23/2023 09:27 PM    AST 15 01/23/2023 09:27 PM    ALT <5 01/23/2023 09:27 PM     BMP: Lab Results   Component Value Date/Time     01/26/2023 05:17 AM    K 3.6 01/26/2023 05:17 AM     01/26/2023 05:17 AM    CO2 24 01/26/2023 05:17 AM    BUN 8 01/26/2023 05:17 AM    LABALBU 3.8 01/23/2023 09:27 PM    CREATININE 0.51 01/26/2023 05:17 AM    CALCIUM 8.9 01/26/2023 05:17 AM    GFRAA CANNOT BE CALCULATED 06/14/2021 07:07 PM    LABGLOM >60 01/26/2023 05:17 AM    GLUCOSE 125 01/26/2023 05:17 AM    GLUCOSE 121 09/06/2011 05:00 AM     PT/INR:    Lab Results   Component Value Date/Time    PROTIME 13.1 01/23/2023 09:27 PM    INR 1.0 01/23/2023 09:27 PM     PTT:    Lab Results   Component Value Date/Time    APTT 30.6 06/14/2021 07:07 PM   [APTT}    Physical Examination:        General appearance: alert, cooperative and no distress  Mental Status: oriented to person, place and time and normal affect  Abdomen: soft, nontender, nondistended, bowel sounds present   Extremities: no edema, redness or tenderness in the calves  Skin: no gross lesions, rashes, or induration    Assessment:        Primary Problem  Colitis     Active Hospital Problems    Diagnosis Date Noted    Hypotension [I95.9] 01/25/2023     Priority: Medium    Hypovolemia [E86.1] 01/24/2023     Priority: Medium    History of seizures [Z87.898] 01/24/2023     Priority: Medium    Hypothyroidism [E03.9] 01/24/2023     Priority: Medium    Colitis [K52.9] 01/23/2023     Priority: Medium    Septic shock ([address] [A41.9, R65.21] 01/23/2023     Priority: Medium     History reviewed. No pertinent past medical history. Plan:        Abdominal pain, colitis on CT  Abdominal pain improved  Abdominal examination benign  Tolerating dysphagia diet  Minimal nausea, antiemetics as needed  Had recent EGD, colonoscopy with Dr. Bull Krause and no signs of colitis at that time  Supportive care  May d/c per GI and follow-up w/ Dr Bull Krause. Time spent reviewing the chart, seeing the patient, and discussing with the attending MD around 30 minutes.     Explained to the patient and d/W Nursing Staff  Will F/U with you  Please call or Page for any issues or change in status  Thanks    Electronically signed by ALYSHA Schmidt NP on 1/26/2023 at 12:21 PM     GI attending physician note. Patient seen with APRN     I independently performed an evaluation on Moni Caban. I have reviewed the above documentation completed by the Nurse Practitioner and agree with the history, examination, and management plan. Recommendations discussed. Patient seen along with APRN and nursing staff. Patient looks stable. No signs of significant diarrhea. Apparently patient is going to be discharged today. Advised to see her gastroenterologist for further GI issues and management.

## 2023-01-27 NOTE — DISCHARGE SUMMARY
[address]    Discharge Summary     Patient ID: Carson Muñoz  :  1960   MRN: [id number]     ACCOUNT:  [de-identified]   Patient's PCP: No primary care provider on file. Admit Date: 2023   Discharge Date: 2023     Length of Stay: 3  Code Status:  Prior  Admitting Physician: Behzad Bah MD  Discharge Physician: Dennis Sandoval MD     Active Discharge Diagnoses:       Primary Problem  [address] Problems    Diagnosis Date Noted    Hypotension [I95.9] 2023     Priority: Medium    Hypovolemia [E86.1] 2023     Priority: Medium    History of seizures [Z87.898] 2023     Priority: Medium    Hypothyroidism [E03.9] 2023     Priority: Medium    Colitis [K52.9] 2023     Priority: Medium    Septic shock ([facility] [address] [A41.9, R65.21] 2023     Priority: Medium       Admission Condition:  serious     Discharged Condition: stable    Hospital Stay:       Hospital Course:  Carson Muñoz is a 63 y.o. female who was admitted for the management of   Colitis , presented to ER with Fatigue and Other (Hypotension)  Past medical history includes Parkinson's disease, seizure disorder, bipolar 1 disorder, hypothyroidism. Prior to admission, patient had been having abdominal pain, diarrhea, nausea and vomiting for last 3 to 4 weeks. The vomiting had been yellow and nonbloody. She reported 1 episode of recent bowel movement with blood in it. On the day of admission, facility called EMS due to patient looking sick. On arrival, EMS found her to have blood pressure in the 60s over 30s. She was given fluid resuscitation in route to the hospital.  Eventually required right femoral central line due to septic shock secondary to hypovolemia requiring Levophed. CT chest abdomen pelvis showed colitis of the ascending colon. Urine culture showed E. coli UTI pansensitive.   Urine tox screen in the ED was positive for benzos and opiates. Patient was started on broad-spectrum antibiotics. ID consulted. General surgery/GI consulted. Stool studies ordered. Patient clinically improved. Shock resolved. Her abdominal pain improved. Nausea improved. Gallbladder ultrasound on 1/24/2023 unremarkable. She had 1 episode of formed stools on the third day of admission. GI panel was negative. Patient was discharged on oral Ceftin for her UTI.       Significant therapeutic interventions: IV antibiotics, IV fluids, IV Levophed    Significant Diagnostic Studies:   Labs / Micro:  CBC:   Lab Results   Component Value Date/Time    WBC 7.0 01/26/2023 05:17 AM    RBC 3.39 01/26/2023 05:17 AM    RBC 3.54 09/06/2011 05:00 AM    HGB 10.8 01/26/2023 05:17 AM    HCT 31.9 01/26/2023 05:17 AM    MCV 94.2 01/26/2023 05:17 AM    MCH 31.8 01/26/2023 05:17 AM    MCHC 33.8 01/26/2023 05:17 AM    RDW 13.5 01/26/2023 05:17 AM     01/26/2023 05:17 AM     09/06/2011 05:00 AM     BMP:    Lab Results   Component Value Date/Time    GLUCOSE 125 01/26/2023 05:17 AM    GLUCOSE 121 09/06/2011 05:00 AM     01/26/2023 05:17 AM    K 3.6 01/26/2023 05:17 AM     01/26/2023 05:17 AM    CO2 24 01/26/2023 05:17 AM    ANIONGAP 8 01/26/2023 05:17 AM    BUN 8 01/26/2023 05:17 AM    CREATININE 0.51 01/26/2023 05:17 AM    BUNCRER NOT REPORTED 06/14/2021 07:07 PM    CALCIUM 8.9 01/26/2023 05:17 AM    LABGLOM >60 01/26/2023 05:17 AM    GFRAA CANNOT BE CALCULATED 06/14/2021 07:07 PM    GFR      06/14/2021 07:07 PM    GFR NOT REPORTED 06/14/2021 07:07 PM     U/A:    Lab Results   Component Value Date/Time    COLORU Yellow 01/23/2023 11:36 PM    TURBIDITY Clear 01/23/2023 11:36 PM    SPECGRAV 1.018 01/23/2023 11:36 PM    HGBUR NEGATIVE 01/23/2023 11:36 PM    PHUR 5.5 01/23/2023 11:36 PM    PROTEINU NEGATIVE 01/23/2023 11:36 PM    GLUCOSEU LARGE 01/23/2023 11:36 PM    KETUA NEGATIVE 01/23/2023 11:36 PM    BILIRUBINUR NEGATIVE 01/23/2023 11:36 PM    UROBILINOGEN Normal 01/23/2023 11:36 PM    NITRU NEGATIVE 01/23/2023 11:36 PM    LEUKOCYTESUR NEGATIVE 01/23/2023 11:36 PM       ,   blood culture: negative  Urine culture: Positive for E. coli pansensitive    Radiology:    US GALLBLADDER RUQ    Result Date: 1/24/2023  EXAMINATION: RIGHT UPPER QUADRANT ULTRASOUND 1/24/2023 1:25 pm COMPARISON: None. HISTORY: ORDERING SYSTEM PROVIDED HISTORY: Rule out gallbladder pathology TECHNOLOGIST PROVIDED HISTORY: Rule out gallbladder pathology FINDINGS: LIVER:  The liver demonstrates normal echogenicity without evidence of intrahepatic biliary ductal dilatation. Hepatopetal flow portal vein. Liver 13.9 cm in length. BILIARY SYSTEM:  Gallbladder is unremarkable without evidence of pericholecystic fluid, wall thickening or stones. Negative sonographic Walsh's sign. Common bile duct is within normal limits measuring 3.8 mm. RIGHT KIDNEY: The right kidney is grossly unremarkable without evidence of hydronephrosis. PANCREAS:  Visualized portions of the pancreas are unremarkable. OTHER: No evidence of right upper quadrant ascites. Unremarkable right upper quadrant ultrasound. XR CHEST PORTABLE    Result Date: 1/23/2023  EXAMINATION: ONE XRAY VIEW OF THE CHEST 1/23/2023 9:29 pm COMPARISON: None. HISTORY: ORDERING SYSTEM PROVIDED HISTORY: chest pain TECHNOLOGIST PROVIDED HISTORY: chest pain Reason for Exam: Chest pain, took best image per pt condition FINDINGS: The cardiomediastinal silhouette is normal in size. There is prominence/fullness of the left hilar region, nonspecific. No focal airspace disease. No pleural effusion or pneumothorax. No evidence of acute osseous abnormality. 1.  Nonspecific fullness/prominence of the left hilum likely secondary to vessels and lymph nodes, although an underlying lesion is not excluded. Consider CT chest with contrast for further evaluation. 2.  No focal airspace disease.      CT CHEST ABDOMEN PELVIS W CONTRAST Additional Contrast? None    Result Date: 1/23/2023  EXAMINATION: CT OF THE CHEST, ABDOMEN, AND PELVIS WITH CONTRAST 1/23/2023 10:01 pm TECHNIQUE: CT of the chest, abdomen and pelvis was performed with the administration of intravenous contrast. Multiplanar reformatted images are provided for review. Automated exposure control, iterative reconstruction, and/or weight based adjustment of the mA/kV was utilized to reduce the radiation dose to as low as reasonably achievable. COMPARISON: None HISTORY: ORDERING SYSTEM PROVIDED HISTORY: abdominal pain TECHNOLOGIST PROVIDED HISTORY: abdominal pain Reason for Exam: abdominal pain, abnormal chest xray Additional signs and symptoms: hip surgeries FINDINGS: Chest: Chest Wall and Thoracic Inlet: No axillary or supraclavicular lymphadenopathy. The thyroid gland is unremarkable. Mediastinum and Luisa: No mediastinal or hilar lymphadenopathy. Normal caliber of the thoracic aorta. Borderline dilation of the main pulmonary artery measuring up to 3.2 cm. No central pulmonary embolism. Borderline cardiomegaly. No pericardial effusion. Lungs/Pleura: Mild centrilobular emphysema. Dependent hypoventilatory changes of the lungs. Bilateral lower lobe bronchiectasis. No evidence of left hilar mass. No pleural effusion. Bones: No suspicious osseous findings. Remote right lateral 5th-7th rib fractures. Subacute to remote right lateral 9th rib fracture. Abdomen/Pelvis: Organs: The liver is unremarkable. Mild intrahepatic biliary ductal prominence. The common bile duct is within normal limits in caliber. The gallbladder is unremarkable. The spleen, pancreas, and adrenal glands are unremarkable. Left upper pole renal cyst demonstrates hairline thin internal septations. No hydronephrosis or renal calculi. GI/Bowel: No evidence of bowel obstruction. There is mucosal hyperenhancement and wall thickening of the ascending colon compatible with colitis.   Mild surrounding inflammatory stranding. Pelvis: Distended bladder. Retroverted uterus with an associated exophytic anterior uterine fibroid. Peritoneum/Retroperitoneum: No retroperitoneal, mesenteric, or pelvic lymphadenopathy. No free fluid or free intraperitoneal gas. The abdominal aorta demonstrates scattered atherosclerosis without aneurysm. Multiple lucent centered venous phleboliths are seen along the right pelvic sidewall. Bones/Soft Tissues: Age-indeterminate compression deformities of the L2 and L3 vertebral bodies. Moderate to severe degenerative disc disease at the L4-L5 level. 1. Mucosal hyperenhancement, moderate wall thickening, and surrounding inflammatory stranding of the ascending colon compatible with colitis. 2.  Minimal intrahepatic biliary ductal prominence without common bile duct dilation. Consider correlation with  LFTs if clinically indicated. 3.  No evidence of left hilar mass. Hilar prominence noted on same day chest radiograph corresponds to prominent hilar vessels. 4.  Age-indeterminate compression deformities of the L2 and L3 vertebral bodies. Correlate with clinical symptoms. Consultations:    Consults:     Final Specialist Recommendations/Findings:   IP CONSULT TO INTERNAL MEDICINE  IP CONSULT TO CRITICAL CARE  IP CONSULT TO SPIRITUAL SERVICES  IP CONSULT TO GENERAL SURGERY  IP CONSULT TO INFECTIOUS DISEASES  IP CONSULT TO GI      The patient was seen and examined on day of discharge and this discharge summary is in conjunction with any daily progress note from day of discharge.     Discharge plan:       Disposition: [place]    Physician Follow Up:     Mike Guo MD  . Maxim Ruiz  [address] [facility]  [phone]    Schedule an appointment as soon as possible for a visit in 4 week(s)  Call and make appointment for 4 weeks       Requiring Further Evaluation/Follow Up POST HOSPITALIZATION/Incidental Findings: None    Diet: regular diet    Activity: As tolerated    Instructions to Patient: Continue antibiotics as prescribed. Continue additional medications as prescribed. Follow-up with PCP in 1 week. Discharge Medications:      Medication List        START taking these medications      cefUROXime 500 MG tablet  Commonly known as: CEFTIN  Take 1 tablet by mouth 2 times daily for 7 days            CONTINUE taking these medications      benztropine 2 MG tablet  Commonly known as: COGENTIN     calcium carbonate 600 MG Tabs tablet     clonazePAM 1 MG tablet  Commonly known as: KLONOPIN     diclofenac sodium 1 % Gel  Commonly known as: VOLTAREN     levothyroxine 75 MCG tablet  Commonly known as: SYNTHROID     Linzess 290 MCG Caps capsule  Generic drug: linaclotide     magnesium oxide 400 MG tablet  Commonly known as: MAG-OX     methocarbamol 750 MG tablet  Commonly known as: ROBAXIN     omeprazole 20 MG delayed release capsule  Commonly known as: PRILOSEC     ondansetron 4 MG tablet  Commonly known as: ZOFRAN     potassium chloride 20 MEQ extended release tablet  Commonly known as: KLOR-CON M     rOPINIRole 0.5 MG tablet  Commonly known as: REQUIP     senna-docusate 8.6-50 MG per tablet  Commonly known as: PERICOLACE     vitamin D 25 MCG (1000 UT) Tabs tablet  Commonly known as: CHOLECALCIFEROL               Where to Get Your Medications        These medications were sent to [facility] [address], [address], [address] [facility] [address]      Phone: [phone]   cefUROXime 500 MG tablet         Time Spent on discharge is  35 mins in patient examination, evaluation, counseling as well as medication reconciliation, prescriptions for required medications, discharge plan and follow up. Electronically signed by   Yann Dickson MD  1/27/2023  3:14 PM      Thank you Dr. Chris Bui primary care provider on file. for the opportunity to be involved in this patient's care.

## 2023-01-29 LAB
CULTURE: NORMAL
CULTURE: NORMAL
Lab: NORMAL
Lab: NORMAL
SPECIMEN DESCRIPTION: NORMAL
SPECIMEN DESCRIPTION: NORMAL